# Patient Record
Sex: FEMALE | Race: WHITE | NOT HISPANIC OR LATINO | Employment: FULL TIME | URBAN - METROPOLITAN AREA
[De-identification: names, ages, dates, MRNs, and addresses within clinical notes are randomized per-mention and may not be internally consistent; named-entity substitution may affect disease eponyms.]

---

## 2017-02-20 ENCOUNTER — TELEPHONE (OUTPATIENT)
Dept: FAMILY MEDICINE | Facility: OTHER | Age: 32
End: 2017-02-20

## 2017-02-20 NOTE — TELEPHONE ENCOUNTER
Summary:    Patient is due/failing the following:   PAP    Action needed:   None previously completed     Type of outreach:    none sent to abstraction    Questions for provider review:    None                                                                                                                                    Steph Ogden       Chart routed to Care Team .        Panel Management Review      Patient has the following on her problem list: None      Composite cancer screening  Chart review shows that this patient is due/due soon for the following Pap Smear

## 2017-02-21 ENCOUNTER — TELEPHONE (OUTPATIENT)
Dept: FAMILY MEDICINE | Facility: OTHER | Age: 32
End: 2017-02-21

## 2017-02-21 NOTE — TELEPHONE ENCOUNTER
----- Message from Steph Ogden CMA sent at 2/20/2017  9:11 AM CST -----  Per media dated 12/28/16 patient completed PAP on 01/01/2014.    Please abstract     Steph Ogden

## 2017-09-28 ENCOUNTER — ALLIED HEALTH/NURSE VISIT (OUTPATIENT)
Dept: FAMILY MEDICINE | Facility: OTHER | Age: 32
End: 2017-09-28
Payer: COMMERCIAL

## 2017-09-28 DIAGNOSIS — Z23 NEED FOR PROPHYLACTIC VACCINATION AND INOCULATION AGAINST INFLUENZA: Primary | ICD-10-CM

## 2017-09-28 PROCEDURE — 99207 ZZC NO CHARGE NURSE ONLY: CPT

## 2017-09-28 PROCEDURE — 90471 IMMUNIZATION ADMIN: CPT

## 2017-09-28 PROCEDURE — 90686 IIV4 VACC NO PRSV 0.5 ML IM: CPT

## 2017-09-28 NOTE — MR AVS SNAPSHOT
After Visit Summary   9/28/2017    Jeannette Sykes    MRN: [id number]           Patient Information     Date Of Birth          1985        Visit Information        Provider Department      9/28/2017 1:00 PM NL FLU SHOT [facility]        Today's Diagnoses     Need for prophylactic vaccination and inoculation against influenza    -  1       Follow-ups after your visit        Who to contact     If you have questions or need follow up information about today's clinic visit or your schedule please contact [facility] directly at [phone].  Normal or non-critical lab and imaging results will be communicated to you by [email or web address], letter or phone within 4 business days after the clinic has received the results. If you do not hear from us within 7 days, please contact the clinic through MomentFeedt or phone. If you have a critical or abnormal lab result, we will notify you by phone as soon as possible.  Submit refill requests through [employer]a or call your pharmacy and they will forward the refill request to us. Please allow 3 business days for your refill to be completed.          Additional Information About Your Visit        MyChart Information     Chroma gives you secure access to your electronic health record. If you see a primary care provider, you can also send messages to your care team and make appointments. If you have questions, please call your primary care clinic.  If you do not have a primary care provider, please call [phone] and they will assist you.        Care EveryWhere ID     This is your Care EveryWhere ID. This could be used by other organizations to access your [place] medical records  [id number]        Your Vitals Were     Last Period                   04/30/2016 (Approximate)            Blood Pressure from Last 3 Encounters:   12/22/16 120/70   03/29/15 118/75    Weight from Last 3 Encounters:   12/22/16 149 lb (67.6 kg)              We Performed  the Following     FLU VAC, SPLIT VIRUS IM > 3 YO (QUADRIVALENT) [68360]     Vaccine Administration, Initial [19639]        Primary Care Provider    None Specified       No primary provider on file.        Equal Access to Services     LEBRON MARR : Hadii aad ku hadmamtasom Loo, robertosalas parisieveretteha, irina silva maresridhar, peng maribellin hayaanils norrismartadasha donohue. So [facility] [phone].    ATENCIÓN: Si habla español, tiene a rodriguez disposición servicios gratuitos de asistencia lingüística. Llame al [phone].    We comply with applicable federal civil rights laws and [place] laws. We do not discriminate on the basis of race, color, national origin, age, disability sex, sexual orientation or gender identity.            Thank you!     Thank you for choosing [facility]  for your care. Our goal is always to provide you with excellent care. Hearing back from our patients is one way we can continue to improve our services. Please take a few minutes to complete the written survey that you may receive in the mail after your visit with us. Thank you!             Your Updated Medication List - Protect others around you: Learn how to safely use, store and throw away your medicines at www.disposemymeds.org.          This list is accurate as of: 9/28/17  1:22 PM.  Always use your most recent med list.                   Brand Name Dispense Instructions for use Diagnosis    PRENATAL 1 PO

## 2017-09-28 NOTE — PROGRESS NOTES
Injectable Influenza Immunization Documentation    1.  Is the person to be vaccinated sick today?   No    2. Does the person to be vaccinated have an allergy to a component   of the vaccine?   No    3. Has the person to be vaccinated ever had a serious reaction   to influenza vaccine in the past?   No    4. Has the person to be vaccinated ever had Guillain-Barré syndrome?   No    Form completed by Terrie Youngblood CMA     Prior to injection verified patient identity using patient's name and date of birth. Patient instructed to remain in clinic for 20 minutes afterwards, and to report any adverse reaction to me immediately.

## 2018-03-25 ENCOUNTER — HEALTH MAINTENANCE LETTER (OUTPATIENT)
Age: 33
End: 2018-03-25

## 2018-06-07 ENCOUNTER — OFFICE VISIT (OUTPATIENT)
Dept: FAMILY MEDICINE | Facility: OTHER | Age: 33
End: 2018-06-07
Payer: COMMERCIAL

## 2018-06-07 VITALS
WEIGHT: 146.8 LBS | HEIGHT: 64 IN | SYSTOLIC BLOOD PRESSURE: 116 MMHG | HEART RATE: 74 BPM | TEMPERATURE: 98.3 F | RESPIRATION RATE: 16 BRPM | BODY MASS INDEX: 25.06 KG/M2 | DIASTOLIC BLOOD PRESSURE: 70 MMHG

## 2018-06-07 DIAGNOSIS — H60.92 OTITIS EXTERNA OF LEFT EAR, UNSPECIFIED CHRONICITY, UNSPECIFIED TYPE: ICD-10-CM

## 2018-06-07 DIAGNOSIS — B37.84: Primary | ICD-10-CM

## 2018-06-07 LAB
GRAM STN SPEC: ABNORMAL
GRAM STN SPEC: ABNORMAL
Lab: ABNORMAL
SPECIMEN SOURCE: ABNORMAL

## 2018-06-07 PROCEDURE — 87077 CULTURE AEROBIC IDENTIFY: CPT | Performed by: NURSE PRACTITIONER

## 2018-06-07 PROCEDURE — 99214 OFFICE O/P EST MOD 30 MIN: CPT | Performed by: NURSE PRACTITIONER

## 2018-06-07 PROCEDURE — 87070 CULTURE OTHR SPECIMN AEROBIC: CPT | Performed by: NURSE PRACTITIONER

## 2018-06-07 PROCEDURE — 87205 SMEAR GRAM STAIN: CPT | Performed by: NURSE PRACTITIONER

## 2018-06-07 PROCEDURE — 87186 SC STD MICRODIL/AGAR DIL: CPT | Performed by: NURSE PRACTITIONER

## 2018-06-07 RX ORDER — CLOTRIMAZOLE 1 G/ML
SOLUTION TOPICAL 2 TIMES DAILY
Qty: 30 ML | Refills: 0 | Status: SHIPPED | OUTPATIENT
Start: 2018-06-07 | End: 2018-06-21

## 2018-06-07 RX ORDER — OFLOXACIN 3 MG/ML
1 SOLUTION/ DROPS OPHTHALMIC 2 TIMES DAILY
Qty: 1 BOTTLE | Refills: 0 | Status: SHIPPED | OUTPATIENT
Start: 2018-06-07 | End: 2022-01-31

## 2018-06-07 NOTE — MR AVS SNAPSHOT
After Visit Summary   6/7/2018    Jeannette Sykes    MRN: [id number]           Patient Information     Date Of Birth          1985        Visit Information        Provider Department      6/7/2018 8:40 AM Vanessa Hernandez APRN [facility]        Today's Diagnoses     Otitis externa, candida    -  1    Otitis externa of left ear, unspecified chronicity, unspecified type          Care Instructions      External Ear Infection (Adult)    External otitis (also called  swimmer s ear ) is an infection in the ear canal. It is often caused by bacteria or fungus. It can occur a few days after water gets trapped in the ear canal (from swimming or bathing). It can also occur after cleaning too deeply in the ear canal with a cotton swab or other object. Sometimes, hair care products get into the ear canal and cause this problem.  Symptoms can include pain, fever, itching, redness, drainage, or swelling of the ear canal. Temporary hearing loss may also occur.  Home care    Do not try to clean the ear canal. This can push pus and bacteria deeper into the canal.    Use prescribed ear drops as directed. These help reduce swelling and fight the infection. If an ear wick was placed in the ear canal, apply drops right onto the end of the wick. The wick will draw the medicine into the ear canal even if it is swollen closed.    A cotton ball may be loosely placed in the outer ear to absorb any drainage.    You may use acetaminophen or ibuprofen to control pain, unless another medicine was prescribed. Note: If you have chronic liver or kidney disease or ever had a stomach ulcer or GI bleeding, talk to your healthcare provider before taking any of these medicines.    Do not allow water to get into your ear when bathing. Also, don't swim until the infection has cleared.  Prevention    Keep your ears dry. This helps lower the risk of infection. Dry your ears with a towel or hair dryer after getting  wet. Also, use ear plugs when swimming.    Do not stick any objects in the ear to remove wax.    If you feel water trapped in your ear, use ear drops right away. You can get these drops over the counter at most drugstores. They work by removing water from the ear canal.  Follow-up care  Follow up with your healthcare provider in 1 week, or as advised.  When to seek medical advice  Call your healthcare provider right away if any of these occur:    Ear pain becomes worse or doesn t improve after 3 days of treatment    Redness or swelling of the outer ear occurs or gets worse    Headache    Painful or stiff neck    Drowsiness or confusion    Fever of 100.4 F (38 C) or higher, or as directed by your healthcare provider    Seizure  Date Last Reviewed: 10/1/2017    5752-1518 [employer]. [address] [place] [address]. All rights reserved. This information is not intended as a substitute for professional medical care. Always follow your healthcare professional's instructions.      Thank you  Vanessa Hernandez CNP            Follow-ups after your visit        Who to contact     If you have questions or need follow up information about today's clinic visit or your schedule please contact [facility] directly at [phone].  Normal or non-critical lab and imaging results will be communicated to you by MyChart, letter or phone within 4 business days after the clinic has received the results. If you do not hear from us within 7 days, please contact the clinic through ObserveIThart or phone. If you have a critical or abnormal lab result, we will notify you by phone as soon as possible.  Submit refill requests through [employer] or call your pharmacy and they will forward the refill request to us. Please allow 3 business days for your refill to be completed.          Additional Information About Your Visit        [employer] Information     [employer] gives you secure access to your electronic health  "record. If you see a primary care provider, you can also send messages to your care team and make appointments. If you have questions, please call your primary care clinic.  If you do not have a primary care provider, please call [phone] and they will assist you.        Care EveryWhere ID     This is your Care EveryWhere ID. This could be used by other organizations to access your [place] medical records  [id number]        Your Vitals Were     Pulse Temperature Respirations Height BMI (Body Mass Index)       74 98.3  F (36.8  C) (Oral) 16 5' 4.3\" (1.633 m) 24.96 kg/m2        Blood Pressure from Last 3 Encounters:   06/07/18 116/70   12/22/16 120/70   03/29/15 118/75    Weight from Last 3 Encounters:   06/07/18 146 lb 12.8 oz (66.6 kg)   12/22/16 149 lb (67.6 kg)              We Performed the Following     Ear Culture Aerobic Bacterial     Gram stain          Today's Medication Changes          These changes are accurate as of 6/7/18  9:31 AM.  If you have any questions, ask your nurse or doctor.               Start taking these medicines.        Dose/Directions    clotrimazole 1 % solution   Commonly known as:  LOTRIMIN   Used for:  Otitis externa, candida   Started by:  Vanessa Hernandez APRN CNP        Apply topically 2 times daily for 14 days   Quantity:  30 mL   Refills:  0       ofloxacin 0.3 % ophthalmic solution   Commonly known as:  OCUFLOX   Used for:  Otitis externa of left ear, unspecified chronicity, unspecified type   Started by:  Vanessa Hernandez APRN CNP        Dose:  1 drop   Place 1 drop Into the left ear 2 times daily   Quantity:  1 Bottle   Refills:  0            Where to get your medicines      These medications were sent to [id number] - [place], [place] - [address]  [address] [address]    Hours:  Formerly [place] - numbers unchanged   9/8/03  Phone:  [phone]     clotrimazole 1 % solution    ofloxacin 0.3 % ophthalmic solution                " Primary Care Provider Office Phone # Fax #    Vanessa Hernandez, MARIE [phone]       [address] [place] [address] [address]  [facility] [address]        Equal Access to Services     LEBRON MARR : Hadii aad ku hadmamtao Soomaali, waaxda luqadaha, qaybta kaalmada adeegyada, waxandreea maribellin hayaan chapis reshmadasha donohue. So [facility] [phone].    ATENCIÓN: Si habla español, tiene a rodriguez disposición servicios gratuitos de asistencia lingüística. Llame al [phone].    We comply with applicable federal civil rights laws and [place] laws. We do not discriminate on the basis of race, color, national origin, age, disability, sex, sexual orientation, or gender identity.            Thank you!     Thank you for choosing [facility]  for your care. Our goal is always to provide you with excellent care. Hearing back from our patients is one way we can continue to improve our services. Please take a few minutes to complete the written survey that you may receive in the mail after your visit with us. Thank you!             Your Updated Medication List - Protect others around you: Learn how to safely use, store and throw away your medicines at www.disposemymeds.org.          This list is accurate as of 6/7/18  9:31 AM.  Always use your most recent med list.                   Brand Name Dispense Instructions for use Diagnosis    clotrimazole 1 % solution    LOTRIMIN    30 mL    Apply topically 2 times daily for 14 days    Otitis externa, candida       ofloxacin 0.3 % ophthalmic solution    OCUFLOX    1 Bottle    Place 1 drop Into the left ear 2 times daily    Otitis externa of left ear, unspecified chronicity, unspecified type

## 2018-06-07 NOTE — PROGRESS NOTES
SUBJECTIVE:   Jeannette Sykes is a 33 year old female who presents to clinic today for the following health issues:    HPI  Concern - Left Ear pain   Onset: Monday    Description:   Was diagnosed with swimmers ear last year and has used the drops for this twice. This pain feels different.     Intensity: moderate    Progression of Symptoms:  worsening    Accompanying Signs & Symptoms:  -pain in left ear, throbbing with intermittent shooting pain  -ear inside feels swollen  -hearing does not sound crisp  -pt could not touch ear last night because of intense pain   -feels a pulling sensation when smiling or turning head inside the ear     Previous history of similar problem:     - Recent history of MRSA (diagnosed last month) she was treated with bactrim the wound is now improved.     Therapies Tried and outcome: swimmers ear drops, ice, heat     She was treated last in Dec. 2017 she was treated and symptoms improved. She started to have symptoms Monday. With pain that has worsened it does improve with ibuprofen. States symptoms are intense pain that is throbbing and random shooting pain. The area feels full and pressure. She state itching and burning sensation is present    She denies history of ear infections as a child. She denies wearing ear plugs or swimming.     Problem list and histories reviewed & adjusted, as indicated.  Additional history: as documented    Patient Active Problem List   Diagnosis     Female infertility     History reviewed. No pertinent surgical history.    Social History   Substance Use Topics     Smoking status: Former Smoker     Smokeless tobacco: Never Used     Alcohol use No     Family History   Problem Relation Age of Onset     Alzheimer Disease Mother          Current Outpatient Prescriptions   Medication Sig Dispense Refill     clotrimazole (LOTRIMIN) 1 % solution Apply topically 2 times daily for 14 days 30 mL 0     ofloxacin (OCUFLOX) 0.3 % ophthalmic solution Place 1 drop Into the left  "ear 2 times daily 1 Bottle 0     No Known Allergies  BP Readings from Last 3 Encounters:   06/07/18 116/70   12/22/16 120/70   03/29/15 118/75    Wt Readings from Last 3 Encounters:   06/07/18 146 lb 12.8 oz (66.6 kg)   12/22/16 149 lb (67.6 kg)                  Labs reviewed in EPIC    ROS:  Constitutional, HEENT, cardiovascular, pulmonary, gi and gu systems are negative, except as otherwise noted.    OBJECTIVE:     /70 (BP Location: Left arm, Patient Position: Chair, Cuff Size: Adult Large)  Pulse 74  Temp 98.3  F (36.8  C) (Oral)  Resp 16  Ht 5' 4.3\" (1.633 m)  Wt 146 lb 12.8 oz (66.6 kg)  BMI 24.96 kg/m2  Body mass index is 24.96 kg/(m^2).  GENERAL: healthy, alert and no distress  EYES: Eyes grossly normal to inspection, PERRL and conjunctivae and sclerae normal  HENT: normal cephalic/atraumatic, left ear: erythematous, purulent drainage in canal and red and boggy canal, nose and mouth without ulcers or lesions, oropharynx clear and oral mucous membranes moist  NECK: no adenopathy, no asymmetry, masses, or scars and thyroid normal to palpation  RESP: lungs clear to auscultation - no rales, rhonchi or wheezes  CV: regular rate and rhythm, normal S1 S2, no S3 or S4, no murmur, click or rub, no peripheral edema and peripheral pulses strong  SKIN: no suspicious lesions or rashes    Diagnostic Test Results:  none     ASSESSMENT/PLAN:     1. Otitis externa, candida  Due to symptoms of itching and burning as well as erythema in external ear canal will treat for candida.   - clotrimazole (LOTRIMIN) 1 % solution; Apply topically 2 times daily for 14 days  Dispense: 30 mL; Refill: 0  - Ear Culture Aerobic Bacterial  - Gram stain    2. Otitis externa of left ear, unspecified chronicity, unspecified type  Will also treat for otitis externa due to purulent ear drainage and external ear erythema and pain.   Culture obtained.   - ofloxacin (OCUFLOX) 0.3 % ophthalmic solution; Place 1 drop Into the left ear 2 times " daily  Dispense: 1 Bottle; Refill: 0  - Ear Culture Aerobic Bacterial  - Gram stain    Patient Instructions     External Ear Infection (Adult)    External otitis (also called  swimmer s ear ) is an infection in the ear canal. It is often caused by bacteria or fungus. It can occur a few days after water gets trapped in the ear canal (from swimming or bathing). It can also occur after cleaning too deeply in the ear canal with a cotton swab or other object. Sometimes, hair care products get into the ear canal and cause this problem.  Symptoms can include pain, fever, itching, redness, drainage, or swelling of the ear canal. Temporary hearing loss may also occur.  Home care    Do not try to clean the ear canal. This can push pus and bacteria deeper into the canal.    Use prescribed ear drops as directed. These help reduce swelling and fight the infection. If an ear wick was placed in the ear canal, apply drops right onto the end of the wick. The wick will draw the medicine into the ear canal even if it is swollen closed.    A cotton ball may be loosely placed in the outer ear to absorb any drainage.    You may use acetaminophen or ibuprofen to control pain, unless another medicine was prescribed. Note: If you have chronic liver or kidney disease or ever had a stomach ulcer or GI bleeding, talk to your healthcare provider before taking any of these medicines.    Do not allow water to get into your ear when bathing. Also, don't swim until the infection has cleared.  Prevention    Keep your ears dry. This helps lower the risk of infection. Dry your ears with a towel or hair dryer after getting wet. Also, use ear plugs when swimming.    Do not stick any objects in the ear to remove wax.    If you feel water trapped in your ear, use ear drops right away. You can get these drops over the counter at most drugstores. They work by removing water from the ear canal.  Follow-up care  Follow up with your healthcare provider in 1  week, or as advised.  When to seek medical advice  Call your healthcare provider right away if any of these occur:    Ear pain becomes worse or doesn t improve after 3 days of treatment    Redness or swelling of the outer ear occurs or gets worse    Headache    Painful or stiff neck    Drowsiness or confusion    Fever of 100.4 F (38 C) or higher, or as directed by your healthcare provider    Seizure  Date Last Reviewed: 10/1/2017    9872-7687 [employer]. [address] [place] [address]. All rights reserved. This information is not intended as a substitute for professional medical care. Always follow your healthcare professional's instructions.      Thank you  Vanessa Hernandez [facility]

## 2018-06-07 NOTE — PATIENT INSTRUCTIONS
External Ear Infection (Adult)    External otitis (also called  swimmer s ear ) is an infection in the ear canal. It is often caused by bacteria or fungus. It can occur a few days after water gets trapped in the ear canal (from swimming or bathing). It can also occur after cleaning too deeply in the ear canal with a cotton swab or other object. Sometimes, hair care products get into the ear canal and cause this problem.  Symptoms can include pain, fever, itching, redness, drainage, or swelling of the ear canal. Temporary hearing loss may also occur.  Home care    Do not try to clean the ear canal. This can push pus and bacteria deeper into the canal.    Use prescribed ear drops as directed. These help reduce swelling and fight the infection. If an ear wick was placed in the ear canal, apply drops right onto the end of the wick. The wick will draw the medicine into the ear canal even if it is swollen closed.    A cotton ball may be loosely placed in the outer ear to absorb any drainage.    You may use acetaminophen or ibuprofen to control pain, unless another medicine was prescribed. Note: If you have chronic liver or kidney disease or ever had a stomach ulcer or GI bleeding, talk to your healthcare provider before taking any of these medicines.    Do not allow water to get into your ear when bathing. Also, don't swim until the infection has cleared.  Prevention    Keep your ears dry. This helps lower the risk of infection. Dry your ears with a towel or hair dryer after getting wet. Also, use ear plugs when swimming.    Do not stick any objects in the ear to remove wax.    If you feel water trapped in your ear, use ear drops right away. You can get these drops over the counter at most drugstores. They work by removing water from the ear canal.  Follow-up care  Follow up with your healthcare provider in 1 week, or as advised.  When to seek medical advice  Call your healthcare provider right away if any of these  occur:    Ear pain becomes worse or doesn t improve after 3 days of treatment    Redness or swelling of the outer ear occurs or gets worse    Headache    Painful or stiff neck    Drowsiness or confusion    Fever of 100.4 F (38 C) or higher, or as directed by your healthcare provider    Seizure  Date Last Reviewed: 10/1/2017    3051-3992 [employer]. [address] [place] [address]. All rights reserved. This information is not intended as a substitute for professional medical care. Always follow your healthcare professional's instructions.      Thank you  Vanessa Hernandez CNP

## 2018-06-09 LAB
BACTERIA SPEC CULT: ABNORMAL
Lab: ABNORMAL
SPECIMEN SOURCE: ABNORMAL

## 2018-06-11 ENCOUNTER — TELEPHONE (OUTPATIENT)
Dept: FAMILY MEDICINE | Facility: OTHER | Age: 33
End: 2018-06-11

## 2018-06-11 RX ORDER — GENTAMICIN SULFATE 3 MG/ML
2 SOLUTION/ DROPS OPHTHALMIC 2 TIMES DAILY
Qty: 3 ML | Refills: 0 | Status: SHIPPED | OUTPATIENT
Start: 2018-06-11 | End: 2018-06-26

## 2018-06-11 NOTE — TELEPHONE ENCOUNTER
Spoke with pt and gave information below. Pt would like to know if she should stop both the medications you gave her previously or just the ofloxacin as this is being replaced by the gentamicin.    Adrianne Yeboah CMA ([facility])

## 2018-06-11 NOTE — TELEPHONE ENCOUNTER
----- Message from MARIE Bashir CNP sent at 2018  1:33 PM CDT -----  Please advise Jeannette Sykes,  1985, that her lab results were positive for MRSA infection change made to antibiotic therapy. If symptoms do not clear recommend ENT referral.   [phone] (home)   Thank you  Vanessa Hernandez CNP

## 2018-06-13 NOTE — TELEPHONE ENCOUNTER
Called patient and she states that she was already informed of the message.    Nadja Mccabe, CMA  June 13, 2018

## 2018-10-10 ENCOUNTER — TELEPHONE (OUTPATIENT)
Dept: FAMILY MEDICINE | Facility: OTHER | Age: 33
End: 2018-10-10

## 2018-10-10 DIAGNOSIS — H60.92 RECURRENT OTITIS EXTERNA OF LEFT EAR: Primary | ICD-10-CM

## 2018-10-10 NOTE — TELEPHONE ENCOUNTER
Reason for Call:  Other referral to ENT    Detailed comments: pt states seen for ear flare up ion 06/7/18 with Rose Marie and was told if it kept re occurring that rose marie would write a referral to ENT. Pt wondering if Rose Marie can go forward with writing this referral. Please advise     Phone Number Patient can be reached at: Cell number on file:    Telephone Information:   Mobile [phone]       Best Time: ANY    Can we leave a detailed message on this number? YES    Call taken on 10/10/2018 at 3:18 PM by Suyapa Neely

## 2018-10-10 NOTE — TELEPHONE ENCOUNTER
Patient is going to call her insurance company and let us know if there is a place closer to [facility].     No

## 2018-10-10 NOTE — TELEPHONE ENCOUNTER
Attempted call. No answer and mailbox is full.       ENT referral information below:    Your provider has referred you to: [facility]: [facility] [phone]

## 2018-10-10 NOTE — TELEPHONE ENCOUNTER
Patient informed. She is wondering if there is anything closer to [facility]? [phone]

## 2018-10-11 NOTE — TELEPHONE ENCOUNTER
Reason for Call:  Other returning call    Detailed comments: Patient called clinic back regarding message from below. She wants referral sent to [facility] - phone number: [phone]/fax number: [phone]  Please send referral.     Phone Number Patient can be reached at: Home number on file [phone] (home)    Best Time: any    Can we leave a detailed message on this number? YES    Call taken on 10/11/2018 at 11:22 AM by Idalia Bee

## 2018-10-12 NOTE — TELEPHONE ENCOUNTER
New referral placed to ENT [facility].     Thank you  Vanessa Hernandez CNP

## 2018-10-12 NOTE — TELEPHONE ENCOUNTER
Referral has been faxed, I attempted to contact patient but mailbox was full unable to leave a message try again later  Lubna Ritchie RT (R)

## 2018-10-26 ENCOUNTER — TELEPHONE (OUTPATIENT)
Dept: FAMILY MEDICINE | Facility: OTHER | Age: 33
End: 2018-10-26

## 2018-10-26 NOTE — TELEPHONE ENCOUNTER
You placed a referral for patient to ENT on 10/10/18.  Patient has not scheduled as of yet.      Please review and forward to team if follow up with the patient is needed.     Thank you!  Celine/Clinic Referrals Dyad II

## 2019-10-07 ENCOUNTER — IMMUNIZATION (OUTPATIENT)
Dept: FAMILY MEDICINE | Facility: OTHER | Age: 34
End: 2019-10-07
Payer: COMMERCIAL

## 2019-10-07 DIAGNOSIS — Z23 NEED FOR PROPHYLACTIC VACCINATION AND INOCULATION AGAINST INFLUENZA: Primary | ICD-10-CM

## 2019-10-07 PROCEDURE — 90471 IMMUNIZATION ADMIN: CPT

## 2019-10-07 PROCEDURE — 90686 IIV4 VACC NO PRSV 0.5 ML IM: CPT

## 2019-10-07 PROCEDURE — 99207 ZZC NO CHARGE NURSE ONLY: CPT

## 2020-02-10 ENCOUNTER — MEDICAL CORRESPONDENCE (OUTPATIENT)
Dept: HEALTH INFORMATION MANAGEMENT | Facility: CLINIC | Age: 35
End: 2020-02-10

## 2020-02-24 ENCOUNTER — MEDICAL CORRESPONDENCE (OUTPATIENT)
Dept: HEALTH INFORMATION MANAGEMENT | Facility: CLINIC | Age: 35
End: 2020-02-24

## 2020-03-10 ENCOUNTER — HEALTH MAINTENANCE LETTER (OUTPATIENT)
Age: 35
End: 2020-03-10

## 2020-03-12 ENCOUNTER — TELEPHONE (OUTPATIENT)
Dept: PEDIATRICS | Facility: OTHER | Age: 35
End: 2020-03-12

## 2020-03-12 DIAGNOSIS — Z20.828 EXPOSURE TO INFLUENZA: Primary | ICD-10-CM

## 2020-03-12 RX ORDER — OSELTAMIVIR PHOSPHATE 75 MG/1
75 CAPSULE ORAL DAILY
Qty: 10 CAPSULE | Refills: 0 | Status: SHIPPED | OUTPATIENT
Start: 2020-03-12 | End: 2020-03-22

## 2020-03-12 NOTE — TELEPHONE ENCOUNTER
Son has influenza A, new baby at home.  Will prophylax.  Electronically signed by Terrie Mclaughlin M.D.

## 2020-05-01 ENCOUNTER — MEDICAL CORRESPONDENCE (OUTPATIENT)
Dept: HEALTH INFORMATION MANAGEMENT | Facility: CLINIC | Age: 35
End: 2020-05-01

## 2020-05-29 ENCOUNTER — MEDICAL CORRESPONDENCE (OUTPATIENT)
Dept: HEALTH INFORMATION MANAGEMENT | Facility: CLINIC | Age: 35
End: 2020-05-29

## 2020-08-04 ENCOUNTER — MEDICAL CORRESPONDENCE (OUTPATIENT)
Dept: HEALTH INFORMATION MANAGEMENT | Facility: CLINIC | Age: 35
End: 2020-08-04

## 2020-10-15 ENCOUNTER — IMMUNIZATION (OUTPATIENT)
Dept: FAMILY MEDICINE | Facility: OTHER | Age: 35
End: 2020-10-15
Payer: COMMERCIAL

## 2020-10-15 DIAGNOSIS — Z23 NEED FOR PROPHYLACTIC VACCINATION AND INOCULATION AGAINST INFLUENZA: Primary | ICD-10-CM

## 2020-10-15 PROCEDURE — 99207 PR NO CHARGE NURSE ONLY: CPT

## 2020-10-15 PROCEDURE — 90471 IMMUNIZATION ADMIN: CPT

## 2020-10-15 PROCEDURE — 90686 IIV4 VACC NO PRSV 0.5 ML IM: CPT

## 2020-11-20 ENCOUNTER — VIRTUAL VISIT (OUTPATIENT)
Dept: FAMILY MEDICINE | Facility: OTHER | Age: 35
End: 2020-11-20
Payer: COMMERCIAL

## 2020-11-20 PROCEDURE — 99421 OL DIG E/M SVC 5-10 MIN: CPT | Performed by: PHYSICIAN ASSISTANT

## 2020-11-20 NOTE — PROGRESS NOTES
"Date: 2020 11:18:34  Clinician: Yaniv Akhtar  Clinician NPI: [id number]  Patient: Jeannette Sykes  Patient : 1985  Patient Address: [address] [place] [address]  Patient Phone: [phone]  Visit Protocol: URI  Patient Summary:  Jeannette is a 35 year old ( : 1985 ) female who initiated a [facility] Visit for COVID-19 (Coronavirus) evaluation and screening. When asked the question \"Please sign me up to receive news, health information and promotions from [facility].\", Jeannette responded \"No\".    Jeannette states her symptoms started 1-2 days ago.   Her symptoms consist of rhinitis, a sore throat, ageusia, a headache, and nasal congestion.   Symptom details     Nasal secretions: The color of her mucus is yellow.    Sore throat: Jeannette reports having mild throat pain (1-3 on a 10 point pain scale), does not have exudate on her tonsils, and can swallow liquids. She is not sure if the lymph nodes in her neck are enlarged. A rash has not appeared on the skin since the sore throat started.     Headache: She states the headache is mild (1-3 on a 10 point pain scale).      Jeannette denies having vomiting, facial pain or pressure, myalgias, chills, malaise, teeth pain, diarrhea, ear pain, wheezing, fever, cough, nausea, and anosmia. She also denies taking antibiotic medication in the past month, having recent facial or sinus surgery in the past 60 days, and having a sinus infection within the past year. She is not experiencing dyspnea.   Precipitating events  Jeannette is not sure if she has been exposed to someone with strep throat.   Pertinent COVID-19 (Coronavirus) information  Jeannette does not work or volunteer as healthcare worker or a . In the past 14 days, Jeannette has not worked or volunteered at a healthcare facility or group living setting.   In the past 14 days, she also has not lived in a congregate living setting.   Jeannette has not had a close contact with a laboratory-confirmed COVID-19 patient within " 14 days of symptom onset.    Since December 2019, Jeannette has been tested for COVID-19 and has had upper respiratory infection (URI) or influenza-like illness.      Result of COVID-19 test: Negative     Date of her COVID-19 test: 08/16/2020     Date(s) of previous URI or influenza-like illness (free-text): The month of August     Symptoms Jeannette experienced during previous URI or influenza-like illness as reported by the patient (free-text): Cold, headache, fever, cough, congested, runny nose        Pertinent medical history  Jeannette does not get yeast infections when she takes antibiotics.   Jeannette does not need a return to work/school note.   Weight: 145 lbs   Jeannette does not smoke or use smokeless tobacco.   She denies pregnancy and denies breastfeeding. She has menstruated in the past month.   Weight: 145 lbs    MEDICATIONS: No current medications, ALLERGIES: NKDA  Clinician Response:  Dear Jeannette,   Your symptoms show that you may have coronavirus (COVID-19). This illness can cause fever, cough and trouble breathing. Many people get a mild case and get better on their own. Some people can get very sick.  What should I do?  We would like to test you for this virus.   1. Please call [phone] to schedule your visit. Explain that you were referred by [facility] to have a COVID-19 test. Be ready to share your [facility] visit ID number.  * If you need to schedule in [facility] please call [phone] or for [employer] employees please call [phone].  * If you need to schedule in the [place] area please call [phone]. [place] employees call [phone].  The following will serve as your written order for this COVID Test, ordered by me, for the indication of suspected COVID [Z20.828]: The test will be ordered in [employer]Context, our electronic health record, after you are scheduled. It will show as ordered and authorized by Gary Sanders MD.  Order: COVID-19 (Coronavirus) PCR for SYMPTOMATIC testing from [facility].   2. When it's time  "for your COVID test:  Stay at least 6 feet away from others. (If someone will drive you to your test, stay in the backseat, as far away from the  as you can.)   Cover your mouth and nose with a mask, tissue or washcloth.  Go straight to the testing site. Don't make any stops on the way there or back.      3.Starting now: Stay home and away from others (self-isolate) until:   You've had no fever---and no medicine that reduces fever---for one full day (24 hours). And...   Your other symptoms have gotten better. For example, your cough or breathing has improved. And...   At least 10 days have passed since your symptoms started.       During this time, don't leave the house except for testing or medical care.   Stay in your own room, even for meals. Use your own bathroom if you can.   Stay away from others in your home. No hugging, kissing or shaking hands. No visitors.  Don't go to work, school or anywhere else.    Clean \"high touch\" surfaces often (doorknobs, counters, handles, etc.). Use a household cleaning spray or wipes. You'll find a full list of  on the EPA website: www.epa.gov/pesticide-registration/list-n-disinfectants-use-against-sars-cov-2.   Cover your mouth and nose with a mask, tissue or washcloth to avoid spreading germs.  Wash your hands and face often. Use soap and water.  Caregivers in these groups are at risk for severe illness due to COVID-19:  o People 65 years and older  o People who live in a nursing home or long-term care facility  o People with chronic disease (lung, heart, cancer, diabetes, kidney, liver, immunologic)  o People who have a weakened immune system, including those who:   Are in cancer treatment  Take medicine that weakens the immune system, such as corticosteroids  Had a bone marrow or organ transplant  Have an immune deficiency  Have poorly controlled HIV or AIDS  Are obese (body mass index of 40 or higher)  Smoke regularly   o Caregivers should wear gloves while " washing dishes, handling laundry and cleaning bedrooms and bathrooms.  o Use caution when washing and drying laundry: Don't shake dirty laundry, and use the warmest water setting that you can.  o For more tips, go to www.cdc.gov/coronavirus/2019-ncov/downloads/10Things.pdf.    How can I take care of myself?    Get lots of rest. Drink extra fluids (unless a doctor has told you not to).   Take Tylenol (acetaminophen) for fever or pain. If you have liver or kidney problems, ask your family doctor if it's okay to take Tylenol.   Adults can take either:    650 mg (two 325 mg pills) every 4 to 6 hours, or...   1,000 mg (two 500 mg pills) every 8 hours as needed.    Note: Don't take more than 3,000 mg in one day. Acetaminophen is found in many medicines (both prescribed and over-the-counter medicines). Read all labels to be sure you don't take too much.   For children, check the Tylenol bottle for the right dose. The dose is based on the child's age or weight.    If you have other health problems (like cancer, heart failure, an organ transplant or severe kidney disease): Call your specialty clinic if you don't feel better in the next 2 days.       Know when to call 911. Emergency warning signs include:    Trouble breathing or shortness of breath Pain or pressure in the chest that doesn't go away Feeling confused like you haven't felt before, or not being able to wake up Bluish-colored lips or face.  Where can I get more information?   [facility] -- About COVID-19: [email or web address]   CDC -- What to Do If You're Sick: www.cdc.gov/coronavirus/2019-ncov/about/steps-when-sick.html   CDC -- Ending Home Isolation: www.cdc.gov/coronavirus/2019-ncov/hcp/disposition-in-home-patients.html   CDC -- Caring for Someone: www.cdc.gov/coronavirus/2019-ncov/if-you-are-sick/care-for-someone.html   [facility] -- Interim Guidance for Hospital Discharge to Home: www.[facility]./diseases/coronavirus/hcp/hospdischarge.pdf    [facility] clinical trials (COVID-19 research studies): clinicalaffairs.[facility]-clinical-trials    Below are the COVID-19 hotlines at the [facility] of Health ([facility]). Interpreters are available.    For health questions: Call 998-733-5528 or 1-148.959.6142 (7 a.m. to 7 p.m.) For questions about schools and childcare: Call 196-736-9574 or 1-279.623.6558 (7 a.m. to 7 p.m.)    Diagnosis: Contact with and (suspected) exposure to other viral communicable diseases  Diagnosis ICD: Z20.828

## 2021-04-24 ENCOUNTER — HEALTH MAINTENANCE LETTER (OUTPATIENT)
Age: 36
End: 2021-04-24

## 2021-10-03 ENCOUNTER — HEALTH MAINTENANCE LETTER (OUTPATIENT)
Age: 36
End: 2021-10-03

## 2022-01-31 ENCOUNTER — OFFICE VISIT (OUTPATIENT)
Dept: OBGYN | Facility: OTHER | Age: 37
End: 2022-01-31
Payer: COMMERCIAL

## 2022-01-31 VITALS
TEMPERATURE: 98.6 F | DIASTOLIC BLOOD PRESSURE: 89 MMHG | SYSTOLIC BLOOD PRESSURE: 140 MMHG | HEART RATE: 96 BPM | BODY MASS INDEX: 21 KG/M2 | WEIGHT: 123.5 LBS

## 2022-01-31 DIAGNOSIS — N92.0 MENORRHAGIA WITH REGULAR CYCLE: ICD-10-CM

## 2022-01-31 DIAGNOSIS — Z01.419 WELL WOMAN EXAM WITH ROUTINE GYNECOLOGICAL EXAM: Primary | ICD-10-CM

## 2022-01-31 DIAGNOSIS — Z30.09 GENERAL COUNSELING FOR PRESCRIPTION OF ORAL CONTRACEPTIVES: ICD-10-CM

## 2022-01-31 DIAGNOSIS — Z12.4 SCREENING FOR MALIGNANT NEOPLASM OF CERVIX: ICD-10-CM

## 2022-01-31 LAB
ERYTHROCYTE [DISTWIDTH] IN BLOOD BY AUTOMATED COUNT: 13.2 % (ref 10–15)
HCT VFR BLD AUTO: 40.3 % (ref 35–47)
HGB BLD-MCNC: 13.1 G/DL (ref 11.7–15.7)
MCH RBC QN AUTO: 30.5 PG (ref 26.5–33)
MCHC RBC AUTO-ENTMCNC: 32.5 G/DL (ref 31.5–36.5)
MCV RBC AUTO: 94 FL (ref 78–100)
PLATELET # BLD AUTO: 306 10E3/UL (ref 150–450)
RBC # BLD AUTO: 4.3 10E6/UL (ref 3.8–5.2)
TSH SERPL DL<=0.005 MIU/L-ACNC: 1.92 MU/L (ref 0.4–4)
WBC # BLD AUTO: 8.3 10E3/UL (ref 4–11)

## 2022-01-31 PROCEDURE — 87624 HPV HI-RISK TYP POOLED RSLT: CPT | Performed by: ADVANCED PRACTICE MIDWIFE

## 2022-01-31 PROCEDURE — G0145 SCR C/V CYTO,THINLAYER,RESCR: HCPCS | Performed by: ADVANCED PRACTICE MIDWIFE

## 2022-01-31 PROCEDURE — 36415 COLL VENOUS BLD VENIPUNCTURE: CPT | Performed by: ADVANCED PRACTICE MIDWIFE

## 2022-01-31 PROCEDURE — 84443 ASSAY THYROID STIM HORMONE: CPT | Performed by: ADVANCED PRACTICE MIDWIFE

## 2022-01-31 PROCEDURE — 85027 COMPLETE CBC AUTOMATED: CPT | Performed by: ADVANCED PRACTICE MIDWIFE

## 2022-01-31 PROCEDURE — 99385 PREV VISIT NEW AGE 18-39: CPT | Performed by: ADVANCED PRACTICE MIDWIFE

## 2022-01-31 RX ORDER — NORETHINDRONE ACETATE AND ETHINYL ESTRADIOL 1MG-20(21)
1 KIT ORAL DAILY
Qty: 84 TABLET | Refills: 3 | Status: SHIPPED | OUTPATIENT
Start: 2022-01-31 | End: 2022-05-25

## 2022-01-31 NOTE — LETTER
January 31, 2022      RE: Jeannette Sykes  [address]  [facility] [address]       To whom it may concern:    Jeannette Sykes was seen in our clinic.  She tested positive for COVID on 1/11/2022.  And is currently completely recovered from symptoms of COVID.    Sincerely,      Kathia BENAVIDEZ

## 2022-01-31 NOTE — PROGRESS NOTES
CC/HPI: Jeannette Sykes is a 36 year old female who presents for annual exam. She is currently using None/None needed for birth control.  Concerns today include: menorrhagia.   Pap  Letter for travel secondary to COVID.  Weight loss.       HISTORIES: updated and reviewed    Patient Active Problem List   Diagnosis     Female infertility     Past Medical History:   Diagnosis Date     Female infertility      Menorrhagia with regular cycle      Past Surgical History:   Procedure Laterality Date      SECTION      x 2     ROTATOR CUFF REPAIR RT/LT       SEPTOPLASTY       Current Outpatient Medications   Medication Sig Dispense Refill     norethindrone-ethinyl estradiol (LOESTRIN FE ) 1-20 MG-MCG tablet Take 1 tablet by mouth daily Take one tablet daily 84 tablet 3     No Known Allergies  Social History     Socioeconomic History     Marital status:      Spouse name: Not on file     Number of children: Not on file     Years of education: Not on file     Highest education level: Not on file   Occupational History     Not on file   Tobacco Use     Smoking status: Former Smoker     Smokeless tobacco: Never Used   Substance and Sexual Activity     Alcohol use: No     Drug use: No     Sexual activity: Yes     Partners: Male     Birth control/protection: None   Other Topics Concern     Parent/sibling w/ CABG, MI or angioplasty before 65F 55M? Not Asked   Social History Narrative     Not on file     Social Determinants of Health     Financial Resource Strain: Not on file   Food Insecurity: Not on file   Transportation Needs: Not on file   Physical Activity: Not on file   Stress: Not on file   Social Connections: Not on file   Intimate Partner Violence: Not on file   Housing Stability: Not on file     Family History   Problem Relation Age of Onset     Alzheimer Disease Mother      Hypertension Father      Substance Abuse Maternal Grandmother      Anxiety Disorder Maternal Grandmother      Cerebrovascular Disease  Maternal Grandmother          Menstrual History  every 28-32 days  Length of menses: 5 days  Menstrual description: heavy      ROS:  CONSTITUTIONAL: NEGATIVE for fever, chills  INTEGUMENTARY/SKIN: NEGATIVE for worrisome rashes, moles or lesions  EYES: NEGATIVE for vision changes   ENT/MOUTH: NEGATIVE for ear, mouth and throat problems  RESP: NEGATIVE for significant cough or SOB  BREAST: NEGATIVE for masses, tenderness or discharge  CV: NEGATIVE for chest pain, palpitations   GI: NEGATIVE for nausea, abdominal pain, heartburn, or change in bowel habits  : NEGATIVE for frequency, dysuria, or hematuria  MUSCULOSKELETAL: NEGATIVE for significant arthralgias or myalgia  NEURO: NEGATIVE for weakness, dizziness or paresthesias or headache    EXAM:  BP (!) 140/89 (BP Location: Right arm, Patient Position: Sitting, Cuff Size: Adult Regular)   Pulse 96   Temp 98.6  F (37  C) (Temporal)   Wt 56 kg (123 lb 8 oz)   LMP 01/14/2022 (Exact Date)   BMI 21.00 kg/m    General - pleasant female in no acute distress.  Skin - no suspicious lesions or rashes  EENT-  PERRLA, tympanic membranes intact and pearly gray, euthyroid with out palpable nodules  Neck - supple without lymphadenopathy.  Lungs - clear to auscultation bilaterally.  Heart - regular rate and rhythm without murmur.  Breasts- symmetrical . No dominant fixed or suspicious masses noted.  No skin or nipple changes or axillary nodes.   Abdomen - soft, nontender, nondistended, no masses or organomegaly noted.  Musculoskeletal - no gross deformities.  Neurological - normal strength, sensation, and mental status.  Pelvic - EG: normal  female, vulva reveals no erythema or lesions.   BUS: within normal limits.  Vagina: well rugated, no lesions polyps or suspicious  discharge.     Cervix: no lesions, polyps discharge or CMT.  Rectovaginal - deferred.    ASSESSMENT/PLAN  (Z01.419) Well woman exam with routine gynecological exam  (primary encounter diagnosis)  Comment:   Plan:      (Z30.09) General counseling for prescription of oral contraceptives  Comment:   Plan: norethindrone-ethinyl estradiol (LOESTRIN FE         1/20) 1-20 MG-MCG tablet          The use of the oral contraceptive pill has been fully discussed with the patient. This includes the proper method to initiate  and continue the pill, the need for regular compliance to ensure adequate contraceptive effect, the physiology which makes the pill effective, the instructions for what to do in event of a missed pill, and warnings about anticipated minor side effects such as breakthrough spotting, nausea, breast tenderness, weight changes, acne, headaches, etc. She was informed of the irregular bleeding pattern that can occur when the pill is first started or a new form is changed over for the first 2-3 months.  She has been told of the more serious potential side effects such as MI, stroke, and deep vein thrombosis, all of which are very unlikely.  She has been asked to report any signs of such serious problems immediately.   She understands and wishes to take the medication as prescribed.      (Z12.4) Screening for malignant neoplasm of cervix  Comment:   Plan: Pap screen with HPV - recommended age 30 - 65         years                 The following topics were discussed or recommended  regular exercise  healthy diet/nutrition  contraception    Encouraged to increase her calorie intake.  She can do this by eating 3 rather than 1 meal a day and focus on calorie dense foods.   Encouraged to consider an IUD if the pill doesn't work for her.   Brochure provided    Body mass index is 21 kg/m .  Obesity Action Plan:

## 2022-02-02 LAB
BKR LAB AP GYN ADEQUACY: NORMAL
BKR LAB AP GYN INTERPRETATION: NORMAL
BKR LAB AP HPV REFLEX: NORMAL
BKR LAB AP LMP: NORMAL
BKR LAB AP PREVIOUS ABNORMAL: NORMAL
PATH REPORT.COMMENTS IMP SPEC: NORMAL
PATH REPORT.COMMENTS IMP SPEC: NORMAL
PATH REPORT.RELEVANT HX SPEC: NORMAL

## 2022-02-04 LAB
HUMAN PAPILLOMA VIRUS 16 DNA: NEGATIVE
HUMAN PAPILLOMA VIRUS 18 DNA: NEGATIVE
HUMAN PAPILLOMA VIRUS FINAL DIAGNOSIS: NORMAL
HUMAN PAPILLOMA VIRUS OTHER HR: NEGATIVE

## 2022-02-07 ENCOUNTER — TELEPHONE (OUTPATIENT)
Dept: FAMILY MEDICINE | Facility: CLINIC | Age: 37
End: 2022-02-07
Payer: COMMERCIAL

## 2022-02-07 ENCOUNTER — ALLIED HEALTH/NURSE VISIT (OUTPATIENT)
Dept: FAMILY MEDICINE | Facility: OTHER | Age: 37
End: 2022-02-07
Payer: COMMERCIAL

## 2022-02-07 DIAGNOSIS — Z23 ENCOUNTER FOR IMMUNIZATION: Primary | ICD-10-CM

## 2022-02-07 PROCEDURE — 90471 IMMUNIZATION ADMIN: CPT

## 2022-02-07 PROCEDURE — 99207 PR NO CHARGE NURSE ONLY: CPT

## 2022-02-07 PROCEDURE — 90686 IIV4 VACC NO PRSV 0.5 ML IM: CPT

## 2022-02-07 NOTE — TELEPHONE ENCOUNTER
Pt was seen 1/31/22. She received a signed letter from Dr. Nubia Overton but forgot it in the clinic. Can provider please print and sign and leave at  for pt to ? She will be in the [place] clinic with her children today at 235. Pt needs this by Thursday.     Telma Mcghee, CECILN, RN, PHN  Registered Nurse-Clinic Triage  [facility]  2/7/2022 at 10:16 AM

## 2022-02-10 ENCOUNTER — NURSE TRIAGE (OUTPATIENT)
Dept: NURSING | Facility: CLINIC | Age: 37
End: 2022-02-10
Payer: COMMERCIAL

## 2022-02-10 ENCOUNTER — TELEPHONE (OUTPATIENT)
Dept: OBGYN | Facility: OTHER | Age: 37
End: 2022-02-10
Payer: COMMERCIAL

## 2022-02-10 NOTE — TELEPHONE ENCOUNTER
"Patient calling in with regards to note.    [place] be back 2/20     is a bit worried the note written will not be good enough.    Needs to have this written in note per patient.    \"will be traveling to [place] 2/12/22-2/20/22 she is clear to fly without COVID-19 testing. Per CDC guidelines there is no need to retest her for the next 90 days for COVID-19.\"    Patient states with leaving in two days, she will need to pick this up in clinic today or tomorrow 2/11 latest.     Routing to provider to advise    HERNÁN Mooney/[employer] [facility]  February 10, 2022      "

## 2022-02-10 NOTE — TELEPHONE ENCOUNTER
Pt calling back asking that the letter she needs to  has information from both the letters (one from 1/31/22 and today).   Her  is advising that the more information the better, and also that it is signed by the provider.     Please call pt when the letter is available to be picked up.   [phone]. Ok to leave a detailed message if needed.     Fang José, RN, BSN

## 2022-02-10 NOTE — TELEPHONE ENCOUNTER
needs to be added to note.    Fadia Pathak RN  [facility] Nurse Advisor

## 2022-02-10 NOTE — TELEPHONE ENCOUNTER
Patient calling back stating this letter also needs to be signed. And when ready please call and she will  at the .

## 2022-04-05 ENCOUNTER — TELEPHONE (OUTPATIENT)
Dept: FAMILY MEDICINE | Facility: OTHER | Age: 37
End: 2022-04-05
Payer: COMMERCIAL

## 2022-04-05 DIAGNOSIS — Z32.01 PREGNANCY TEST POSITIVE: Primary | ICD-10-CM

## 2022-04-05 NOTE — TELEPHONE ENCOUNTER
LMP 3/9/2022, 3w6d, positive HPT yesterday, periods have been regular every 28 days, not on any form of BC.    Pt wanting HCG quants to be done. Pt denies vaginal bleeding or cramping at this time.    RN routing to provider for advisement on orders, RN will then assist in scheduling pt.    Sheyla Esposito RN on 4/5/2022 at 2:42 PM

## 2022-04-05 NOTE — TELEPHONE ENCOUNTER
"Patient calling. Had a positive at home pregnancy test. She states she is very early in her pregnancy, she is at the point now where she should be getting her period. She has not technically \"missed\" her period yet.     Patient would like to have HCG levels drawn. She last saw OB provider for well woman exam.     Routing to OB team to call patient and assist in scheduling future appointments and labs.     Elizabeth JACOBN, RN       "

## 2022-04-05 NOTE — TELEPHONE ENCOUNTER
Latoya Florian, DO  You Just now (3:07 PM)     KK    HCG orders signed. Recommend at least two draws, 48-72 hours apart to trend rise. Also recommend OB intake/new OB in first trimester.     Thank you,   Latoya Florian, DO     Message text      RN assisted pt in scheduling appts.    Patient verbalized understanding and agreed to plan.     Sheyla Esposito RN on 4/5/2022 at 3:19 PM

## 2022-04-12 ENCOUNTER — LAB (OUTPATIENT)
Dept: LAB | Facility: OTHER | Age: 37
End: 2022-04-12
Payer: COMMERCIAL

## 2022-04-12 DIAGNOSIS — Z32.01 PREGNANCY TEST POSITIVE: ICD-10-CM

## 2022-04-12 LAB — B-HCG SERPL-ACNC: 8303 IU/L (ref 0–5)

## 2022-04-12 PROCEDURE — 84702 CHORIONIC GONADOTROPIN TEST: CPT

## 2022-04-12 PROCEDURE — 36415 COLL VENOUS BLD VENIPUNCTURE: CPT

## 2022-04-14 ENCOUNTER — LAB (OUTPATIENT)
Dept: LAB | Facility: OTHER | Age: 37
End: 2022-04-14
Payer: COMMERCIAL

## 2022-04-14 DIAGNOSIS — Z32.01 PREGNANCY TEST POSITIVE: ICD-10-CM

## 2022-04-14 LAB — B-HCG SERPL-ACNC: ABNORMAL IU/L (ref 0–5)

## 2022-04-14 PROCEDURE — 84702 CHORIONIC GONADOTROPIN TEST: CPT

## 2022-04-14 PROCEDURE — 36415 COLL VENOUS BLD VENIPUNCTURE: CPT

## 2022-04-15 ENCOUNTER — TELEPHONE (OUTPATIENT)
Dept: OBGYN | Facility: OTHER | Age: 37
End: 2022-04-15
Payer: COMMERCIAL

## 2022-04-15 NOTE — TELEPHONE ENCOUNTER
See pt's mychart response and sent to provider for advisement.    RN closing this encounter.    Sheyla Esposito RN on 4/15/2022 at 9:15 AM

## 2022-04-15 NOTE — TELEPHONE ENCOUNTER
Spoke with patient. Yesterday had some spotting dark brown like end on period when she wipe and she inserted finger to see if there was anymore and pulled a small clot the size of rey dark brown and stringy.  This am was less dark brown. This is only when wipping.     Low back feels like cramping.      Was recently treated for a sinus infection: amoxicillin      Benozinate: tessalon lindsey  No fever.    Two recent HCG levels.    hCG Quantitative   Date Value Ref Range Status   04/14/2022 14,707 (H) 0 - 5 IU/L Final     Comment:     Adult:0-5 IU/L for healthy non-pregnant person  Neonates:Should be within normal ranges by 2 days after birth     Previous 04/12/2022  8,303    Please advise if you would want her seen today or just monitor and if needed to repeat HCG?    Clemente Felix, BSN, RN, PHN  [facility] ~ Registered Nurse  Clinic Triage ~ [facility]  April 15, 2022

## 2022-04-16 ENCOUNTER — LAB (OUTPATIENT)
Dept: LAB | Facility: CLINIC | Age: 37
End: 2022-04-16
Payer: COMMERCIAL

## 2022-04-16 DIAGNOSIS — Z32.01 PREGNANCY TEST POSITIVE: ICD-10-CM

## 2022-04-16 LAB — B-HCG SERPL-ACNC: ABNORMAL IU/L (ref 0–5)

## 2022-04-16 PROCEDURE — 84702 CHORIONIC GONADOTROPIN TEST: CPT

## 2022-04-16 PROCEDURE — 36415 COLL VENOUS BLD VENIPUNCTURE: CPT

## 2022-04-18 ENCOUNTER — ANCILLARY PROCEDURE (OUTPATIENT)
Dept: ULTRASOUND IMAGING | Facility: OTHER | Age: 37
End: 2022-04-18
Attending: OBSTETRICS & GYNECOLOGY
Payer: COMMERCIAL

## 2022-04-18 DIAGNOSIS — O20.9 VAGINAL BLEEDING AFFECTING EARLY PREGNANCY: ICD-10-CM

## 2022-04-18 PROCEDURE — 76817 TRANSVAGINAL US OBSTETRIC: CPT | Performed by: RADIOLOGY

## 2022-04-18 PROCEDURE — 76801 OB US < 14 WKS SINGLE FETUS: CPT | Performed by: RADIOLOGY

## 2022-04-19 ENCOUNTER — PRENATAL OFFICE VISIT (OUTPATIENT)
Dept: OBGYN | Facility: CLINIC | Age: 37
End: 2022-04-19
Payer: COMMERCIAL

## 2022-04-19 DIAGNOSIS — O09.529 AMA (ADVANCED MATERNAL AGE) MULTIGRAVIDA 35+: Primary | ICD-10-CM

## 2022-04-19 DIAGNOSIS — Z23 NEED FOR TDAP VACCINATION: ICD-10-CM

## 2022-04-19 PROBLEM — N93.9 ABNORMAL UTERINE BLEEDING (AUB): Status: ACTIVE | Noted: 2019-03-05

## 2022-04-19 PROBLEM — A49.02 MRSA (METHICILLIN RESISTANT STAPHYLOCOCCUS AUREUS) INFECTION: Status: ACTIVE | Noted: 2018-11-09

## 2022-04-19 PROBLEM — Z87.440 HISTORY OF UTI: Status: ACTIVE | Noted: 2019-07-24

## 2022-04-19 PROBLEM — Z87.42 HISTORY OF ABNORMAL CERVICAL PAP SMEAR: Status: ACTIVE | Noted: 2018-12-04

## 2022-04-19 PROCEDURE — 99207 PR NO CHARGE NURSE ONLY: CPT

## 2022-04-19 RX ORDER — BENZONATATE 100 MG/1
CAPSULE ORAL
COMMUNITY
Start: 2022-03-31 | End: 2022-05-25

## 2022-04-19 RX ORDER — FERROUS SULFATE 325(65) MG
325 TABLET, DELAYED RELEASE (ENTERIC COATED) ORAL
COMMUNITY
End: 2022-05-26

## 2022-04-19 RX ORDER — VITAMIN A ACETATE, .BETA.-CAROTENE, ASCORBIC ACID, CHOLECALCIFEROL, .ALPHA.-TOCOPHEROL ACETATE, DL-, THIAMINE MONONITRATE, RIBOFLAVIN, NIACINAMIDE, PYRIDOXINE HYDROCHLORIDE, FOLIC ACID, CYANOCOBALAMIN, CALCIUM CARBONATE, FERROUS FUMARATE, ZINC OXIDE, AND CUPRIC OXIDE 2000; 2000; 120; 400; 22; 1.84; 3; 20; 10; 1; 12; 200; 27; 25; 2 [IU]/1; [IU]/1; MG/1; [IU]/1; MG/1; MG/1; MG/1; MG/1; MG/1; MG/1; UG/1; MG/1; MG/1; MG/1; MG/1
1 TABLET ORAL DAILY
COMMUNITY

## 2022-04-19 RX ORDER — LIDOCAINE HYDROCHLORIDE 20 MG/ML
SOLUTION OROPHARYNGEAL
COMMUNITY
Start: 2022-03-27 | End: 2022-05-25

## 2022-04-19 NOTE — PROGRESS NOTES
Telephone visit with patient for New Prenatal Intake and Education. This is patient's third pregnancy. Handouts reviewed and will be provided at next prenatal appointment. Scheduled for New Prenatal with Kathia SALAZAR CNM on 5/2/2022.       Prenatal OB Questionnaire  Patient supplied answers from flow sheet for:  Prenatal OB Questionnaire.  Past Medical History  Have you ever recieved care for your mental health? : (P) No  Have you ever been in a major accident or suffered serious trauma?: (P) No  Within the last year, has anyone hit, slapped, kicked or otherwise hurt you?: (P) No  In the last year, has anyone forced you to have sex when you didn't want to?: (P) No    Past Medical History 2   Have you ever received a blood transfusion?: (P) No  Would you accept a blood transfusion if was medically recommended?: (P) Unknown  Does anyone in your home smoke?: (P) No   Is your blood type Rh negative?: (P) No  Have you ever ?: (!) (P) Yes  Have you been hospitalized for a nonsurgical reason excluding normal delivery?: (P) No  Have you ever had an abnormal pap smear?: (!) (P) Yes    Past Medical History (Continued)  Do you have a history of abnormalities of the uterus?: (P) No  Did your mother take BEAU or any other hormones when she was pregnant with you?: (P) Unknown  Do you have any other problems we have not asked about which you feel may be important to this pregnancy?: (P) No    PHQ-2 Score:     PHQ-2 ( 1999 Pfizer) 4/17/2022 6/7/2018   Q1: Little interest or pleasure in doing things 0 0   Q2: Feeling down, depressed or hopeless 0 0   PHQ-2 Score 0 0   Q1: Little interest or pleasure in doing things Not at all -   Q2: Feeling down, depressed or hopeless Not at all -   PHQ-2 Score 0 -       Allergies as of 4/19/2022:    Allergies as of 04/19/2022     (No Known Allergies)       Current medications are:  Current Outpatient Medications   Medication Sig Dispense Refill     Prenatal Vit-Fe Fumarate-FA (PNV  PRENATAL PLUS MULTIVITAMIN) 27-1 MG TABS per tablet Take 1 tablet by mouth daily       amoxicillin-clavulanate (AUGMENTIN) 875-125 MG tablet Take 1 tablet by mouth (Patient not taking: Reported on 4/19/2022)       amoxicillin-clavulanate (AUGMENTIN) 875-125 MG tablet TAKE 1 TABLET BY MOUTH TWICE A DAY FOR 7 DAYS (Patient not taking: Reported on 4/19/2022)       benzonatate (TESSALON) 100 MG capsule SWALLOW WHOLE TAKE 1 CAPSULE 3 TIMES A DAY AS NEEDED *DO NOT BREAK CHEW, DISSOLVE, CUT, OR CRUSH* (Patient not taking: Reported on 4/19/2022)       ferrous sulfate (FE TABS) 325 (65 Fe) MG EC tablet Take 325 mg by mouth (Patient not taking: Reported on 4/19/2022)       lidocaine, viscous, (XYLOCAINE) 2 % solution TAKE 10-15ML BY MOUTH EVERY 4 HOURS AS NEEDED FOR SORE THROAT FOR UP TO 3 DAYS. GARGLE AND SPIT SOLUTION. MAX OF 8 DOSES/DAY (Patient not taking: Reported on 4/19/2022)       norethindrone-ethinyl estradiol (LOESTRIN FE 1/20) 1-20 MG-MCG tablet Take 1 tablet by mouth daily Take one tablet daily (Patient not taking: Reported on 4/19/2022) 84 tablet 3         Early ultrasound screening tool:    Does patient have irregular periods?  Yes  Did patient use hormonal birth control in the three months prior to positive urine pregnancy test? No  Is the patient breastfeeding?  No  Is the patient 10 weeks or greater at time of education visit?  No    Pt had early US done yesterday, no repeat order placed.    Sheyla Esposito RN on 4/19/2022 at 1:17 PM

## 2022-05-02 ENCOUNTER — PRENATAL OFFICE VISIT (OUTPATIENT)
Dept: OBGYN | Facility: OTHER | Age: 37
End: 2022-05-02
Payer: COMMERCIAL

## 2022-05-02 VITALS
HEART RATE: 76 BPM | DIASTOLIC BLOOD PRESSURE: 87 MMHG | WEIGHT: 127.75 LBS | BODY MASS INDEX: 21.72 KG/M2 | SYSTOLIC BLOOD PRESSURE: 134 MMHG

## 2022-05-02 DIAGNOSIS — O09.521 MULTIGRAVIDA OF ADVANCED MATERNAL AGE IN FIRST TRIMESTER: Primary | ICD-10-CM

## 2022-05-02 DIAGNOSIS — Z87.59 HISTORY OF PRE-ECLAMPSIA: ICD-10-CM

## 2022-05-02 DIAGNOSIS — O36.0910 ANTI-E ISOIMMUNIZATION AFFECTING PREGNANCY IN FIRST TRIMESTER, SINGLE OR UNSPECIFIED FETUS: ICD-10-CM

## 2022-05-02 DIAGNOSIS — Z98.891 HISTORY OF 2 CESAREAN SECTIONS: ICD-10-CM

## 2022-05-02 LAB
ABO/RH(D): ABNORMAL
ALBUMIN UR-MCNC: NEGATIVE MG/DL
ANTIBODY SCREEN: POSITIVE
APPEARANCE UR: CLEAR
BACTERIA #/AREA URNS HPF: ABNORMAL /HPF
BILIRUB UR QL STRIP: NEGATIVE
COLOR UR AUTO: YELLOW
ERYTHROCYTE [DISTWIDTH] IN BLOOD BY AUTOMATED COUNT: 13.1 % (ref 10–15)
GLUCOSE UR STRIP-MCNC: NEGATIVE MG/DL
HCT VFR BLD AUTO: 37.7 % (ref 35–47)
HGB BLD-MCNC: 12.7 G/DL (ref 11.7–15.7)
HGB UR QL STRIP: ABNORMAL
KETONES UR STRIP-MCNC: NEGATIVE MG/DL
LEUKOCYTE ESTERASE UR QL STRIP: NEGATIVE
MCH RBC QN AUTO: 30.9 PG (ref 26.5–33)
MCHC RBC AUTO-ENTMCNC: 33.7 G/DL (ref 31.5–36.5)
MCV RBC AUTO: 92 FL (ref 78–100)
NITRATE UR QL: NEGATIVE
PH UR STRIP: 5.5 [PH] (ref 5–7)
PLATELET # BLD AUTO: 275 10E3/UL (ref 150–450)
RBC # BLD AUTO: 4.11 10E6/UL (ref 3.8–5.2)
RBC #/AREA URNS AUTO: ABNORMAL /HPF
SP GR UR STRIP: 1.02 (ref 1–1.03)
SPECIMEN EXPIRATION DATE: ABNORMAL
SQUAMOUS #/AREA URNS AUTO: ABNORMAL /LPF
UROBILINOGEN UR STRIP-ACNC: 0.2 E.U./DL
WBC # BLD AUTO: 9 10E3/UL (ref 4–11)
WBC #/AREA URNS AUTO: ABNORMAL /HPF

## 2022-05-02 PROCEDURE — 87086 URINE CULTURE/COLONY COUNT: CPT | Performed by: ADVANCED PRACTICE MIDWIFE

## 2022-05-02 PROCEDURE — 86803 HEPATITIS C AB TEST: CPT | Performed by: ADVANCED PRACTICE MIDWIFE

## 2022-05-02 PROCEDURE — 99207 PR FIRST OB VISIT: CPT | Performed by: ADVANCED PRACTICE MIDWIFE

## 2022-05-02 PROCEDURE — 86870 RBC ANTIBODY IDENTIFICATION: CPT | Performed by: ADVANCED PRACTICE MIDWIFE

## 2022-05-02 PROCEDURE — 86780 TREPONEMA PALLIDUM: CPT | Performed by: ADVANCED PRACTICE MIDWIFE

## 2022-05-02 PROCEDURE — 86762 RUBELLA ANTIBODY: CPT | Performed by: ADVANCED PRACTICE MIDWIFE

## 2022-05-02 PROCEDURE — 81001 URINALYSIS AUTO W/SCOPE: CPT | Performed by: ADVANCED PRACTICE MIDWIFE

## 2022-05-02 PROCEDURE — 86850 RBC ANTIBODY SCREEN: CPT | Performed by: ADVANCED PRACTICE MIDWIFE

## 2022-05-02 PROCEDURE — 85027 COMPLETE CBC AUTOMATED: CPT | Performed by: ADVANCED PRACTICE MIDWIFE

## 2022-05-02 PROCEDURE — 86900 BLOOD TYPING SEROLOGIC ABO: CPT | Performed by: ADVANCED PRACTICE MIDWIFE

## 2022-05-02 PROCEDURE — 87389 HIV-1 AG W/HIV-1&-2 AB AG IA: CPT | Performed by: ADVANCED PRACTICE MIDWIFE

## 2022-05-02 PROCEDURE — 87340 HEPATITIS B SURFACE AG IA: CPT | Performed by: ADVANCED PRACTICE MIDWIFE

## 2022-05-02 PROCEDURE — 86901 BLOOD TYPING SEROLOGIC RH(D): CPT | Performed by: ADVANCED PRACTICE MIDWIFE

## 2022-05-02 PROCEDURE — 36415 COLL VENOUS BLD VENIPUNCTURE: CPT | Performed by: ADVANCED PRACTICE MIDWIFE

## 2022-05-02 NOTE — PATIENT INSTRUCTIONS
Thank for choosing our clinic for your health care needs. Our goal is to provided you with excellent care. One way that we continue to improve our care is by listening to our patients. Please take a few minutes to complete the written survey that you may receive in the mail after your visit.     You may reach your care team by calling the following number:    Keenan Omer- [phone]   For clinic hours at [place] [facility]  please visit the [employer] web site [email or web address]    Notification of your lab results:  Normal or non-critical lab and imaging results will be communicated to you by Mychart, letter, or phone within 7 days. If you do not hear from us within 10 days, please contact us through [employer] or phone. If you have a critical or abnormal lab result, we will notify you by phone as soon as possible.  Pap smears often take a bit longer.       Medication Refills:  Please contact your pharmacy and they will forward the refill to us. Please allow 3 business days for your refills to be completed.     Secure access to your medical record:  Use [employer]at (secure email communication and access to your chart) to send your primary care provider a message or make an appointment. Ask someone on your Team how to sign up for [employer]. To log on to HubSpot or for more information in [employer] please visit the website at [email or web address].            How to prevent CMV or cytomegalovirus infection while you are pregnant:    Thoroughly wash your hands with soap and warm water especially after doing things such as:  Diaper changes  Feeding or bathing a child  Wiping a child's runny nose or drool  Handling a child's toys    Do not share cups, plates, utensils, toothbrushes or food with your children  Do not kiss young children on the mouth or cheek. Instead, kiss them on the head or give them a hug.  Do not share towels or washcloths with young children.  Clean toy, counter tops, and other surfaces that come in  contact with urine or saliva.        LISTERIA  Individuals can reduce the risk for listeria contamination through proper food selection, handling, and storage, such as:  Rinsing raw produce (fuits and vegetables) before eating, cutting, or cooking;  Keeping refrigerators at 40 degrees F or lower;  Buying soft cheeses only if their labels state that they are made with pasteurized milk.  Also avoiding cheese that has not been initially wrapped in plastic.  These cheeses include brie, camembert, blue and the soft Mexican cheeses like con queso.  Heating all food that can be heated but especially hot dogs, luncheon meats, and cold cuts to an internal temperature of 165 degrees F or until steaming hot before serving them; and  Washing your hands for at least 20 seconds with warm water and soap before and after handling cantaloupes or other melons.  Watch for food recalls for listeria and contact us if you believe you have been exposed.               First line remedies for nausea in pregnancy    Eat small frequent meals every 2-3 hours if possible.   Avoid food at extremes of temparture and drinks with carbination.  Eat foods that appeal to you, avoiding fats and spicy foods.  Bread, pasta, crackers, potatoes, and rice tend to be tolerated the best.  Don't worry about what you eat in the first 3 months, it is more important that you can eat and keep it down.   Try flat ginger ale.  Ginger is a herbal remedy for nausea and you can use it in any form.  There are ginger tablets you can purchase.  The dose is 500 to 1000 mg a day.   You may also try doxylamine 12.5 mg three times a day which is a sleeping medication along with Vitamin B6 25 mg three times a day.  This combination takes up to a week to work so give it some time.  Be sure to take both the doxylamine and B6  Doxylamine is sometimes called Unisom and it comes in 25 mg tablets so you will have to break it in half and take half a tablet.   It is important to take  the Unisom and B6 together or it won't be effective.  If you begin to vomit more than 5 or 6 times a day and feel that you are unable to keep anything down, call the clinic for an appointment to be seen.                Fruits and vegetables high in pesticide contamination  Strawberries  Spinach  Kale  Nectarines  Peaches  Apples  Grapes  Cherries  Pears  Tomatoes  Celery  Potatoes  Hot Peppers.     Consider extra washing of these fruits and vegetables, peeling if possible or purchasing organics.     Fruits and vegetables lowest if pesticide contramination:  Avocado  Sweet corn  Pineapple  Cabbage   Onions   Frozen peas  Papaya  Asparagus  Mushrooms  Eggplant  Honeydew  Kiwi  Cantaloupe  Cauliflower  Broccoli      Consider eliminating or reducing the following additives in personal care products and make up:  Triclosan  Paraben  Phthalates  Phenols  Products that do not contain these additives are often found in the organic or green sections of stores.

## 2022-05-02 NOTE — PROGRESS NOTES
Jeannette Sykes is a 36 year old  who presents to the clinic for an new ob visit.   Estimated Date of Delivery: Dec 14, 2022 is calculated from Patient's last menstrual period was 2022.       She has had bleeding since her LMP.  And ultrasound was completed.  Had one day of spotting since..   She has not had nausea. Weigh loss has not occurred.   Pregnancy was a surprise      HISTORY  updated and reviewed  Past Medical History:   Diagnosis Date     Female infertility      Menorrhagia with regular cycle      Past Surgical History:   Procedure Laterality Date      SECTION      x 2     ROTATOR CUFF REPAIR RT/LT       SEPTOPLASTY       Social History     Socioeconomic History     Marital status:      Spouse name: Not on file     Number of children: Not on file     Years of education: Not on file     Highest education level: Not on file   Occupational History     Not on file   Tobacco Use     Smoking status: Former Smoker     Smokeless tobacco: Never Used   Vaping Use     Vaping Use: Never used   Substance and Sexual Activity     Alcohol use: No     Drug use: No     Sexual activity: Yes     Partners: Male     Birth control/protection: None   Other Topics Concern     Parent/sibling w/ CABG, MI or angioplasty before 65F 55M? Not Asked   Social History Narrative     Not on file     Social Determinants of Health     Financial Resource Strain: Not on file   Food Insecurity: Not on file   Transportation Needs: Not on file   Physical Activity: Not on file   Stress: Not on file   Social Connections: Not on file   Intimate Partner Violence: Not on file   Housing Stability: Not on file     Health Maintenance   Topic Date Due     ADVANCE CARE PLANNING  Never done     HIV SCREENING  Never done     HEPATITIS C SCREENING  Never done     DTAP/TDAP/TD IMMUNIZATION (1 - Tdap) 2019     COVID-19 Vaccine (3 - Booster for Moderna series) 2022     PREVENTIVE CARE VISIT  2023     HPV TEST  2027      PAP  2027     PHQ-2 (once per calendar year)  Completed     INFLUENZA VACCINE  Completed     Pneumococcal Vaccine: Pediatrics (0 to 5 Years) and At-Risk Patients (6 to 64 Years)  Aged Out     IPV IMMUNIZATION  Aged Out     MENINGITIS IMMUNIZATION  Aged Out     HEPATITIS B IMMUNIZATION  Aged Out     Family History   Problem Relation Age of Onset     Alzheimer Disease Mother      Hypertension Father      Substance Abuse Maternal Grandmother      Anxiety Disorder Maternal Grandmother      Cerebrovascular Disease Maternal Grandmother             ROS: 10 point ROS neg other than the symptoms noted above in the HPI.      PHYSICAL EXAM  LMP 2022   GENERAL:  Pleasant pregnant female, alert, cooperative  and well groomed.  SKIN:  Warm and dry, without lesions or rashes  HEAD: Symmetrical features.  EYES:  PERRLA.  EARS: Tympanic membranes gray, translucent and intact.  NECK:  Thyroid without enlargement and nodules.  Lymph nodes not palpable.   LUNGS:  Clear to auscultation.  BREAST:  Symmetrical.  No dominant, fixed or suspicious masses are noted.  No skin or nipple changes or axillary nodes.    Nipples everted.      HEART:  RRR with  out murmur.  ABDOMEN: Soft without masses , tenderness or organomegaly.  No CVA tenderness.  Uterus palpable at size equal to dates.  Well healed scar from  section.  MUSCULOSKELETAL:  Full range of motion    ASSESSMENT:  Intrauterine pregnancy of 7w5d  (O09.529) AMA (advanced maternal age) multigravida 35+  (primary encounter diagnosis)  Comment:   Plan: Urine Microscopic Exam            (Z87.59) History of pre-eclampsia  Comment:   Plan:     (Z98.891) History of 2  sections  Comment:   Plan:   Wants a tubal          PLAN:  Options for  testing for chromosomal and birth defects were discussed with the patient. Diagnostic tests include CVS and Amniocentesis. We discussed that these tests are definitive but invasive and do carry a risk of fetal loss.    Screening tests include nuchal translucency/blood marker testing in the first trimester and quad screening in the second trimester. We discussed that these are screening tests and not diagnostic tests and that false positives and negatives are a distinct possibility.  Declines  testing.    Instructed on best evidence for: weight gain for her weight and height for pregnancy; healthy diet and foods to avoid; exercise and activity during pregnancy;avoiding exposure to toxoplasmosis; and maintenance of a generally healthy lifestyle.     [place] [facility] [place]    Reviewed transmission of and avoidance strategies for CMV.        Discussed risks of COVID in pregnancy and  recommendations for vaccine and booster.    Discussed aspirin use in pregnancy.  Low-dose aspirin prophylaxis can be beneficial in women at high risk of developing preeclampsia.  I generally recommend we begin aspirin at about 12-13 weeks gestation and continue until at least 36 weeks.     Women with at least one of the following conditions are considered high risk for developing preeclampsia: Previous pregnancy with preeclampsia,  multifetal-gestation, chronic hypertension, diabetes mellitus, chronic kidney disease, autoimmune disease.     Women with more than 1 of the following conditions may also consider low-dose aspirin prophylaxis in pregnancy: Nulliparity, BMI greater than 30, family history of preeclampsia (mother or sister), AMA, socio-demographic characteristics, personal risk factors.       Patient does  meet the above criteria.  Discussed risks and benefits of low dose Asprin therapy and she elects to proceed.

## 2022-05-03 ENCOUNTER — MYC MEDICAL ADVICE (OUTPATIENT)
Dept: OBGYN | Facility: OTHER | Age: 37
End: 2022-05-03
Payer: COMMERCIAL

## 2022-05-03 LAB
HBV SURFACE AG SERPL QL IA: NONREACTIVE
HCV AB SERPL QL IA: NONREACTIVE
HIV 1+2 AB+HIV1 P24 AG SERPL QL IA: NONREACTIVE
RUBV IGG SERPL QL IA: 4.57 INDEX
RUBV IGG SERPL QL IA: POSITIVE
T PALLIDUM AB SER QL: NONREACTIVE

## 2022-05-04 LAB — BACTERIA UR CULT: NORMAL

## 2022-05-05 ENCOUNTER — MEDICAL CORRESPONDENCE (OUTPATIENT)
Dept: HEALTH INFORMATION MANAGEMENT | Facility: CLINIC | Age: 37
End: 2022-05-05
Payer: COMMERCIAL

## 2022-05-05 ENCOUNTER — TELEPHONE (OUTPATIENT)
Dept: OBGYN | Facility: OTHER | Age: 37
End: 2022-05-05
Payer: COMMERCIAL

## 2022-05-05 NOTE — TELEPHONE ENCOUNTER
Called and discussed results and follow up plan with Mode Machado and MFM referral.   Kathia Overton, MARIE, CNM

## 2022-05-06 LAB
ANTIBODY ID: NORMAL
SPECIMEN EXPIRATION DATE: NORMAL

## 2022-05-09 PROBLEM — R76.8 RED BLOOD CELL ANTIBODY POSITIVE: Status: ACTIVE | Noted: 2022-05-09

## 2022-05-10 ENCOUNTER — LAB (OUTPATIENT)
Dept: LAB | Facility: CLINIC | Age: 37
End: 2022-05-10
Payer: COMMERCIAL

## 2022-05-10 DIAGNOSIS — O36.0910 ANTI-E ISOIMMUNIZATION AFFECTING PREGNANCY IN FIRST TRIMESTER, SINGLE OR UNSPECIFIED FETUS: ICD-10-CM

## 2022-05-10 LAB
SPECIMEN EXPIRATION DATE: NORMAL
XXX BLOOD GROUP AB TITR SERPL: NORMAL {TITER}

## 2022-05-10 PROCEDURE — 86870 RBC ANTIBODY IDENTIFICATION: CPT

## 2022-05-10 PROCEDURE — 36415 COLL VENOUS BLD VENIPUNCTURE: CPT

## 2022-05-10 PROCEDURE — 86850 RBC ANTIBODY SCREEN: CPT

## 2022-05-10 PROCEDURE — 86886 COOMBS TEST INDIRECT TITER: CPT

## 2022-05-11 LAB
ANTIBODY ID: NORMAL
ANTIBODY SCREEN: POSITIVE
SPECIMEN EXPIRATION DATE: ABNORMAL
SPECIMEN EXPIRATION DATE: NORMAL

## 2022-05-25 ENCOUNTER — TRANSFERRED RECORDS (OUTPATIENT)
Dept: HEALTH INFORMATION MANAGEMENT | Facility: CLINIC | Age: 37
End: 2022-05-25
Payer: COMMERCIAL

## 2022-05-25 NOTE — PATIENT INSTRUCTIONS
If you have any questions regarding your visit, Please contact your care team.     [employer]: [phone]  To Schedule an Appointment 24/7  Call: [phone][facility] HOURS TELEPHONE NUMBER     Jacquelyn Joya MD  Assistant Medical Director    Megan- Certified Medical Assistant    Sparkle Levine-HERNÁN Burgess-Surgery Scheduler  Mirella-Surgery Scheduler     Monday-[place]  8:00 am-4:45 pm      Thursday-[place]  8:00 am-4:45 pm      Friday-[place]  7:30 am-4:00 pm    Typical Surgery Day: Tuesday [facility]  [address]  [place], [place] [id number] [phone] Appointment Line  [phone] Fax    Imaging Scheduling all locations  [phone]     [facility] Labor and Delivery  [address] [place] [address] [place] [address]  [phone]    [address] [address] [phone] Appointment Line       **Surgeries** Our Surgery Schedulers will contact you to schedule. If you do not receive a call within 3 business days, please call [phone].    Urgent Care locations:  [facility] Monday-Friday  10 am - 8 pm    Saturday and Sunday   9 am - 5 pm     [phone]   If you need a medication refill, please contact your pharmacy. Please allow 3 business days for your refill to be completed.  As always, Thank you for trusting us with your healthcare needs!      see additional instructions from your care team below

## 2022-05-26 ENCOUNTER — PRENATAL OFFICE VISIT (OUTPATIENT)
Dept: OBGYN | Facility: OTHER | Age: 37
End: 2022-05-26
Payer: COMMERCIAL

## 2022-05-26 VITALS
SYSTOLIC BLOOD PRESSURE: 109 MMHG | BODY MASS INDEX: 21.85 KG/M2 | DIASTOLIC BLOOD PRESSURE: 73 MMHG | WEIGHT: 128.5 LBS | HEART RATE: 82 BPM | OXYGEN SATURATION: 100 %

## 2022-05-26 DIAGNOSIS — R76.8 RED BLOOD CELL ANTIBODY POSITIVE: ICD-10-CM

## 2022-05-26 DIAGNOSIS — Z98.891 HISTORY OF 2 CESAREAN SECTIONS: Primary | ICD-10-CM

## 2022-05-26 DIAGNOSIS — Z87.59 HISTORY OF PRE-ECLAMPSIA: ICD-10-CM

## 2022-05-26 PROCEDURE — 99207 PR PRENATAL VISIT: CPT | Performed by: OBSTETRICS & GYNECOLOGY

## 2022-05-26 NOTE — PROGRESS NOTES
Presents for routine  appointment.  Patient had her 1st  at 36w 4d.  Presented with decreased fetal movement and had a  for non reassuring fetal status remote from delivery.  History of oligohydramnios and SGA in prior pregnancy.    Lower back cramping.   No abnormal discharge, no leaking fluid, no contractions, no vaginal bleeding   ROS:   and GI  negative.     Please see Prenatal Vitals and Notes Flowsheet for objective data.    A/P:  36 year old  at 11w5d       ICD-10-CM    1. History of 2  sections  Z98.891    2. History of pre-eclampsia  Z87.59        Genetic screening -cfDNA done - that is still pending.   History of preE - low dose aspirin for preeclampsia risk reduction   History of  x2, plan repeat with tubal  Anti-E - titer less than 1 on 5/10/22.  Next titer next visit. Her 's blood result is not back yet.   AMA - MFM note reviewed, see below.  Normal NT.  Follow up in July.  They are using the LMP for the due date, which is was not sure of.  She will clarify with them when she sees them next.   Follow up in 4  weeks.      Jacquelyn Joya MD       Impression   =========     1) Romero intrauterine pregnancy at 11w 0d gestational age.   2) Measurements consistent with established dates.   3) The nuchal translucency measurement is within the normal range.   4) The nasal bone was visualized.   5) Visualized anatomy appears normal for early gestational age.     Recommendation   ==============     **REVISED REPORT**     Thank you for referring your patient for a first trimester ultrasound with nuchal translucency screening.     We discussed the results of the ultrasound with the patient.     Your patient had cell free DNA screening today. The results of this screen will be forwarded to you as soon as they are available. Because cell free DNA screening does not   screen for open neural tube defects, the patient should be offered MSAFP screening at  15-20 weeks gestation.     At today?s visit we discussed the basic pathophysiology of red cell alloimmunization and risk of hemolytic disease of the . She was counseled that anti-E   antibodies can cause hemolytic disease of the fetus and . Determining her ?s blood type antigens would further define fetal risk for hemolytic disease.   Because eJannette has had all her children with one partner, and has not had a blood transfusion, it is likely he is at least heterozygous for E. If he is homozygous for E, the   fetus is definitely at potential risk. If he is heterozygous, an amniocentesis could be done to determine fetal genotype and determine if serial titers and ultrasounds are   necessary. Jeannette indicates she is not interested in an invasive procedure. We reviewed that currently her antibody levels are low (<1), and that we would not initiate   additional surveillance at this time. Serial antibody titers are recommended in your office. If her titer reaches 1:8, she would need to undergo serial ultrasounds for weekly   assessment by ultrasound for signs of hydrops and MCA Doppler. We discussed the possibility of fetal blood sampling (PUBS) and transfusion if the MCAs reached a   threshold value of > 1.5 MoM, signifying significant anemia.     Recommendations:   1. Paternal Blood type antigen testing. If positive for E repeat maternal antibody titers every 4 weeks   2. If titers reach >/= 1:8, serial ultrasounds for MCA Doppler will be needed to assess for anemia and possible PUBs and transfusion if > 1.5 MoM.   3. Deliver at term if the maternal antibody never reaches 1:8 or MCA Dopplers remain <1.5 MoM.     Comprehensive ultrasound is scheduled in our office at 18 weeks.     Return to primary provider for continued prenatal care.     If you have questions regarding today's evaluation or if we can be of further service, please contact the [facility].     **Fetal anomalies may be  present but not detected**     REPORT SIGNED BY: Helen Haro MD

## 2022-06-01 ENCOUNTER — MEDICAL CORRESPONDENCE (OUTPATIENT)
Dept: HEALTH INFORMATION MANAGEMENT | Facility: CLINIC | Age: 37
End: 2022-06-01
Payer: COMMERCIAL

## 2022-06-03 ENCOUNTER — TELEPHONE (OUTPATIENT)
Dept: OBGYN | Facility: OTHER | Age: 37
End: 2022-06-03
Payer: COMMERCIAL

## 2022-06-23 ENCOUNTER — PRENATAL OFFICE VISIT (OUTPATIENT)
Dept: OBGYN | Facility: OTHER | Age: 37
End: 2022-06-23
Payer: COMMERCIAL

## 2022-06-23 VITALS
BODY MASS INDEX: 22.32 KG/M2 | OXYGEN SATURATION: 100 % | HEART RATE: 78 BPM | DIASTOLIC BLOOD PRESSURE: 76 MMHG | SYSTOLIC BLOOD PRESSURE: 121 MMHG | WEIGHT: 131.25 LBS

## 2022-06-23 DIAGNOSIS — Z87.59 HISTORY OF PRE-ECLAMPSIA: ICD-10-CM

## 2022-06-23 DIAGNOSIS — O36.0910 ANTI-E ISOIMMUNIZATION AFFECTING PREGNANCY IN FIRST TRIMESTER, SINGLE OR UNSPECIFIED FETUS: Primary | ICD-10-CM

## 2022-06-23 DIAGNOSIS — O09.521 MULTIGRAVIDA OF ADVANCED MATERNAL AGE IN FIRST TRIMESTER: ICD-10-CM

## 2022-06-23 DIAGNOSIS — Z86.14 HISTORY OF MRSA INFECTION: ICD-10-CM

## 2022-06-23 DIAGNOSIS — Z98.891 HISTORY OF 2 CESAREAN SECTIONS: ICD-10-CM

## 2022-06-23 LAB
ANTIBODY ID: NORMAL
ANTIBODY SCREEN: POSITIVE
SPECIMEN EXPIRATION DATE: ABNORMAL
SPECIMEN EXPIRATION DATE: NORMAL
SPECIMEN EXPIRATION DATE: NORMAL
XXX BLOOD GROUP AB TITR SERPL: NORMAL {TITER}

## 2022-06-23 PROCEDURE — 86870 RBC ANTIBODY IDENTIFICATION: CPT | Performed by: OBSTETRICS & GYNECOLOGY

## 2022-06-23 PROCEDURE — 36415 COLL VENOUS BLD VENIPUNCTURE: CPT | Performed by: OBSTETRICS & GYNECOLOGY

## 2022-06-23 PROCEDURE — 86850 RBC ANTIBODY SCREEN: CPT | Performed by: OBSTETRICS & GYNECOLOGY

## 2022-06-23 PROCEDURE — 86886 COOMBS TEST INDIRECT TITER: CPT | Performed by: OBSTETRICS & GYNECOLOGY

## 2022-06-23 PROCEDURE — 99207 PR PRENATAL VISIT: CPT | Performed by: OBSTETRICS & GYNECOLOGY

## 2022-06-23 NOTE — PROGRESS NOTES
Presents for routine  appointment.     No complaints.    No abnormal discharge , no leaking fluid , no contractions , no vaginal bleeding    ROS:   and GI  negative.     Please see Prenatal Vitals and Notes Flowsheet for objective data.    A/P:  37 year old  at 15w5d       ICD-10-CM    1. History of MRSA infection  Z86.14 CANCELED: Methicillin Resistant Staph Aureus PCR   2. 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  Super mild symptoms.  She already has an MFM US and will be getting growth US due to history of SGA in prior pregnancy    1st preg was c/b oligo and SGA.  Uncomplicated 2nd pregnancy.     History of MRSA - cleared at [facility] - will forward to our infection prevention team to get this cleared. She states she was cleared for her last delivery at [facility].     Anti-E - titer less than 1 on 5/10/22.  Next titer today.   is Ee so there is a chance that baby will be E.      AMA - Normal NT.  Follow up in July as noted above.  They are using the LMP for the due date, which is was not sure of.  She will clarify with them when she sees them next.     History of preE - low dose aspirin for preeclampsia risk reduction     History of  x2, plan repeat with tubal.  Sign consent next visit.  Follow up in 4  week(s).      Jacquelyn Joya MD

## 2022-07-27 ENCOUNTER — PRENATAL OFFICE VISIT (OUTPATIENT)
Dept: OBGYN | Facility: CLINIC | Age: 37
End: 2022-07-27
Payer: COMMERCIAL

## 2022-07-27 ENCOUNTER — MYC MEDICAL ADVICE (OUTPATIENT)
Dept: OBGYN | Facility: OTHER | Age: 37
End: 2022-07-27

## 2022-07-27 VITALS
HEART RATE: 103 BPM | BODY MASS INDEX: 23.81 KG/M2 | SYSTOLIC BLOOD PRESSURE: 154 MMHG | DIASTOLIC BLOOD PRESSURE: 109 MMHG | OXYGEN SATURATION: 100 % | WEIGHT: 140 LBS

## 2022-07-27 DIAGNOSIS — O09.521 MULTIGRAVIDA OF ADVANCED MATERNAL AGE IN FIRST TRIMESTER: Primary | ICD-10-CM

## 2022-07-27 PROCEDURE — 99207 PR PRENATAL VISIT: CPT | Performed by: ADVANCED PRACTICE MIDWIFE

## 2022-07-27 NOTE — PROGRESS NOTES
Here today for an extra visit.   Worried secondary to upper abdominal pain over night and progressive shortness of breath.   SOB began on Monday and she feels that every day it is getting worse.  Became winded just walking into the clinic and has not had this in previous pregnancies.  PO2 100% upon arrival today.   Last night when going to bed noted significant upper abdominal pain.  Sounds spasmotic in nature.  She felt perhaps it was BH.  Denies fever, chills, diarrhea and n/v. No change in vaginal discharge.  Baby is active.  Abd felt sore this morning like she had done multiple sit ups.   COVID about 6 weeks ago.  Has had HA on and off with this pregnancy, feels they are worse than with her previous pregnancies.  No relief from tylenol.  Recommend that she go to the ED for evaluation.   Kathia Overton, MARIE, CNM

## 2022-07-27 NOTE — TELEPHONE ENCOUNTER
, 20w4d.    Spoke with pt and she states for the last 3 or 4 days, at night she hasn't been able to get comfortable.  Flipping all positions, and using pillows and gets these shooting pains in stomach from belly button outwards in different directions.  She states when this occurs her belly also gets firm.    She also feels out of breath when she is talking.  And experiences pain  on left side below rib cage when taking a deep breath.  Denies cold like symptoms (cough, congestion).  No fever.  Denies constipation.     Pt states she drinks plenty of water and doesn't feel like she is more stressed or been more active this week.    Pt does have a prenatal appt tomorrow however, pt would like to be seen today just to make sure everything is okay.  Scheduled for a prenatal appt today.    Julianna Langford RN

## 2022-07-28 ENCOUNTER — MYC MEDICAL ADVICE (OUTPATIENT)
Dept: OBGYN | Facility: OTHER | Age: 37
End: 2022-07-28

## 2022-07-28 NOTE — TELEPHONE ENCOUNTER
It looks like they reviewed the results at the time of the visit.  I reviewed the notes and results and agree, labs are within normal limits for pregnancy

## 2022-07-28 NOTE — CONFIDENTIAL NOTE
Pt currently 20w5d, last seen yesterday in clinic and in L&D for elevated BP concerns.    Pt asking for review/interpretation of lab results done at L&D yesterday. RN routing to provider to advise.    Sheyla Esposito RN on 7/28/2022 at 10:55 AM

## 2022-08-01 ENCOUNTER — TELEPHONE (OUTPATIENT)
Dept: OBGYN | Facility: OTHER | Age: 37
End: 2022-08-01

## 2022-08-01 DIAGNOSIS — R06.02 SHORTNESS OF BREATH DUE TO PREGNANCY IN SECOND TRIMESTER: ICD-10-CM

## 2022-08-01 DIAGNOSIS — O26.892 SHORTNESS OF BREATH DUE TO PREGNANCY IN SECOND TRIMESTER: ICD-10-CM

## 2022-08-01 DIAGNOSIS — O13.2 GESTATIONAL HYPERTENSION, SECOND TRIMESTER: ICD-10-CM

## 2022-08-01 DIAGNOSIS — O36.0910 ANTI-E ISOIMMUNIZATION AFFECTING PREGNANCY IN FIRST TRIMESTER, SINGLE OR UNSPECIFIED FETUS: Primary | ICD-10-CM

## 2022-08-01 NOTE — TELEPHONE ENCOUNTER
"Received a call from nurse at [facility]. She state that   Dr. Rouse, would pt to have biweekly BP checks as well as weekly labs (CBC, CMP and the \"main HTN labs\").  When asked what labs are included in the main HTN labs she stated \"Dr. Joya should know\".    [facility] and Dr. Rouse were also wondering why pt didn't have her monthly titer lab drawn in July as she is supposed to have it monthly.  She did have it drawn in May and June but not July.  I do not see that there are standing orders for it.    Routing message to Dr. Joya to place the above quested labs.  Once this has been done we can assist pt in scheduling biweekly nurse visits for a BP check as well as weekly lab appointments.    Julianna Langford RN    "

## 2022-08-01 NOTE — TELEPHONE ENCOUNTER
Discussed with Dr. Rouse.  Patient was seen in triage last week and noted to have a mildly elevated blood pressure.  Patient was seen a couple days later at [facility] and again noted to be hypertensive.  Patient was generally not feeling well at the time.   L&D was closed so patient was seen in the ED.    She was mildly hypoK.  Anion gap was 20.      Called patient.  She is still intermittently feeling unwell.  Still SOB.  She has a BP cuff at home, but not sure if it is measuring correctly.      RN - Please call patient to arrange labs and Nurse visit for BP check for wednesday or thursday.  She will bring her BP cuff to compare.    Also ordered ECHO.  May need to consider chest xray and additional evaluation. See Dr. Rouse's note in Care Everywhere.    Reportable signs and symptoms discussed.

## 2022-08-03 ENCOUNTER — TELEPHONE (OUTPATIENT)
Dept: URGENT CARE | Facility: URGENT CARE | Age: 37
End: 2022-08-03

## 2022-08-03 NOTE — TELEPHONE ENCOUNTER
Pt is calling about referral that was placed 8/1 and she is looking to get scheduled but no one has called her yet. Referring doctor said she should get in this week.     Please call pt to get scheduled at [phone]

## 2022-08-03 NOTE — TELEPHONE ENCOUNTER
Message came to cardiology pool. Called patient and transferred her to Imaging scheduling to set up echo and Labs.     Mirtha Mancia, RN  Cardiology Care Coordinator  [facility] [place]  Phone: [phone]

## 2022-08-09 ENCOUNTER — LAB (OUTPATIENT)
Dept: LAB | Facility: CLINIC | Age: 37
End: 2022-08-09

## 2022-08-09 ENCOUNTER — ANCILLARY PROCEDURE (OUTPATIENT)
Dept: CARDIOLOGY | Facility: CLINIC | Age: 37
End: 2022-08-09
Attending: OBSTETRICS & GYNECOLOGY
Payer: COMMERCIAL

## 2022-08-09 DIAGNOSIS — O36.0910 ANTI-E ISOIMMUNIZATION AFFECTING PREGNANCY IN FIRST TRIMESTER, SINGLE OR UNSPECIFIED FETUS: ICD-10-CM

## 2022-08-09 DIAGNOSIS — O26.892 SHORTNESS OF BREATH DUE TO PREGNANCY IN SECOND TRIMESTER: ICD-10-CM

## 2022-08-09 DIAGNOSIS — O13.2 GESTATIONAL HYPERTENSION, SECOND TRIMESTER: ICD-10-CM

## 2022-08-09 DIAGNOSIS — R06.02 SHORTNESS OF BREATH DUE TO PREGNANCY IN SECOND TRIMESTER: ICD-10-CM

## 2022-08-09 LAB
ALBUMIN MFR UR ELPH: 13 MG/DL
ALBUMIN SERPL-MCNC: 3 G/DL (ref 3.4–5)
ALP SERPL-CCNC: 62 U/L (ref 40–150)
ALT SERPL W P-5'-P-CCNC: 16 U/L (ref 0–50)
ANION GAP SERPL CALCULATED.3IONS-SCNC: 5 MMOL/L (ref 3–14)
ANTIBODY ID: NORMAL
ANTIBODY SCREEN: POSITIVE
AST SERPL W P-5'-P-CCNC: 12 U/L (ref 0–45)
BILIRUB SERPL-MCNC: 0.2 MG/DL (ref 0.2–1.3)
BUN SERPL-MCNC: 7 MG/DL (ref 7–30)
CALCIUM SERPL-MCNC: 8.7 MG/DL (ref 8.5–10.1)
CHLORIDE BLD-SCNC: 109 MMOL/L (ref 94–109)
CO2 SERPL-SCNC: 26 MMOL/L (ref 20–32)
CREAT SERPL-MCNC: 0.57 MG/DL (ref 0.52–1.04)
CREAT UR-MCNC: 157 MG/DL
ERYTHROCYTE [DISTWIDTH] IN BLOOD BY AUTOMATED COUNT: 13.4 % (ref 10–15)
GFR SERPL CREATININE-BSD FRML MDRD: >90 ML/MIN/1.73M2
GLUCOSE BLD-MCNC: 54 MG/DL (ref 70–99)
HCT VFR BLD AUTO: 35.7 % (ref 35–47)
HGB BLD-MCNC: 12 G/DL (ref 11.7–15.7)
LVEF ECHO: NORMAL
MCH RBC QN AUTO: 31.7 PG (ref 26.5–33)
MCHC RBC AUTO-ENTMCNC: 33.6 G/DL (ref 31.5–36.5)
MCV RBC AUTO: 94 FL (ref 78–100)
PLATELET # BLD AUTO: 247 10E3/UL (ref 150–450)
POTASSIUM BLD-SCNC: 4 MMOL/L (ref 3.4–5.3)
PROT SERPL-MCNC: 6.9 G/DL (ref 6.8–8.8)
PROT/CREAT 24H UR: 0.08 MG/MG CR (ref 0–0.2)
RBC # BLD AUTO: 3.79 10E6/UL (ref 3.8–5.2)
SODIUM SERPL-SCNC: 140 MMOL/L (ref 133–144)
SPECIMEN EXPIRATION DATE: ABNORMAL
SPECIMEN EXPIRATION DATE: NORMAL
SPECIMEN EXPIRATION DATE: NORMAL
WBC # BLD AUTO: 8.7 10E3/UL (ref 4–11)
XXX BLOOD GROUP AB TITR SERPL: 1 {TITER}

## 2022-08-09 PROCEDURE — 93306 TTE W/DOPPLER COMPLETE: CPT | Performed by: STUDENT IN AN ORGANIZED HEALTH CARE EDUCATION/TRAINING PROGRAM

## 2022-08-09 PROCEDURE — 85027 COMPLETE CBC AUTOMATED: CPT

## 2022-08-09 PROCEDURE — 86850 RBC ANTIBODY SCREEN: CPT

## 2022-08-09 PROCEDURE — 80053 COMPREHEN METABOLIC PANEL: CPT

## 2022-08-09 PROCEDURE — 86870 RBC ANTIBODY IDENTIFICATION: CPT

## 2022-08-09 PROCEDURE — 84156 ASSAY OF PROTEIN URINE: CPT

## 2022-08-09 PROCEDURE — 36415 COLL VENOUS BLD VENIPUNCTURE: CPT

## 2022-08-09 PROCEDURE — 86886 COOMBS TEST INDIRECT TITER: CPT

## 2022-08-10 LAB
BLOOD BANK CHART COMMENT: NORMAL
SPECIMEN EXPIRATION DATE: NORMAL

## 2022-08-25 ENCOUNTER — PRENATAL OFFICE VISIT (OUTPATIENT)
Dept: OBGYN | Facility: OTHER | Age: 37
End: 2022-08-25
Payer: COMMERCIAL

## 2022-08-25 VITALS
SYSTOLIC BLOOD PRESSURE: 128 MMHG | BODY MASS INDEX: 25.12 KG/M2 | WEIGHT: 147.7 LBS | HEART RATE: 82 BPM | DIASTOLIC BLOOD PRESSURE: 87 MMHG

## 2022-08-25 DIAGNOSIS — O09.521 MULTIGRAVIDA OF ADVANCED MATERNAL AGE IN FIRST TRIMESTER: ICD-10-CM

## 2022-08-25 DIAGNOSIS — O36.0910 ANTI-E ISOIMMUNIZATION AFFECTING PREGNANCY IN FIRST TRIMESTER, SINGLE OR UNSPECIFIED FETUS: Primary | ICD-10-CM

## 2022-08-25 LAB
GLUCOSE 1H P 50 G GLC PO SERPL-MCNC: 75 MG/DL (ref 70–129)
HGB BLD-MCNC: 11.3 G/DL (ref 11.7–15.7)
SPECIMEN EXPIRATION DATE: NORMAL
XXX BLOOD GROUP AB TITR SERPL: <1 {TITER}

## 2022-08-25 PROCEDURE — 36415 COLL VENOUS BLD VENIPUNCTURE: CPT | Performed by: OBSTETRICS & GYNECOLOGY

## 2022-08-25 PROCEDURE — 86850 RBC ANTIBODY SCREEN: CPT | Performed by: OBSTETRICS & GYNECOLOGY

## 2022-08-25 PROCEDURE — 86886 COOMBS TEST INDIRECT TITER: CPT | Performed by: OBSTETRICS & GYNECOLOGY

## 2022-08-25 PROCEDURE — 82950 GLUCOSE TEST: CPT | Performed by: OBSTETRICS & GYNECOLOGY

## 2022-08-25 PROCEDURE — 86905 BLOOD TYPING RBC ANTIGENS: CPT | Performed by: OBSTETRICS & GYNECOLOGY

## 2022-08-25 PROCEDURE — 99207 PR PRENATAL VISIT: CPT | Performed by: OBSTETRICS & GYNECOLOGY

## 2022-08-25 RX ORDER — ASPIRIN 81 MG/1
81 TABLET, CHEWABLE ORAL
COMMUNITY

## 2022-08-25 NOTE — PATIENT INSTRUCTIONS
SIGNS OF  LABOR    Labor is  if it happens more than three weeks before your due date.    It can be hard to know if you are in labor, since the symptoms can be like the normal feelings of pregnancy.  Often, the only difference is the symptoms increase or they don't go away.     Signs of  labor can include:    Change in your vaginal discharge:  You will have more vaginal discharge when you are pregnant and it should be creamy white.  Call the clinic right away if your discharge has a foul odor, pink or bloody,  or if it becomes watery or is more than is normal for you during your pregnancy.    More than 5-6 contractions or tightenings per hour.  Contractions can feel like period cramps or bowel (gas or diarrhea) pain.  You will feel it in the lower part of your abdomen, in your back or as a pressure feeling in your bottom.  It is often regular, coming for 30 seconds or a minute and then going away, only to come back 5 or 10 minutes later. Some contractions are normal during pregnancy, but if you are feeling more than 5-6 in one hour, empty your bladder, then drink 16-24 ounces of water, eat a snack and lay down on your left side. Put your hand on your abdomen to count the contractions.  If after one hour of resting you have still had 5-6 contractions call your clinic.                If you have labs or imaging done, the results will automatically release in [employer] without an interpretation.  Your health care professional will review those results and send an interpretation with recommendations as soon as possible, but this may be 1-3 business days.    If you have any questions regarding your visit, please contact your care team.     [employer]: [phone]  Women s Health CLINIC HOURS TELEPHONE NUMBER       Jacquelyn Joya MD  Assistant Medical Director    Megan - Certified Medical Assistant     Sparkle Burgess-  [place]-     Monday-  [place]  8:00 a.m - 5:00 p.m    Tuesday- Surgery        Thursday- [place]  8:00 a.m - 5:00 p.m.    Friday- [place]  7:30 a.m - 4:00 p.m. [facility]  [address]  [place] [address]  [phone] Fax  Imaging Scheduling [phone]    [facility] Labor and Delivery  [address] [place] [address]  [phone]    [facility]  [address] [place] [address]  [phone] Fax  Imaging Scheduling [phone]     Urgent Care locations:  [facility] Monday-Friday  10 am - 8 pm  Saturday and Sunday   9 am - 5 pm  Monday-Friday   10 am - 8 pm  Saturday and Sunday   9 am - 5 pm   [phone]     **Surgeries** Our Surgery Schedulers will contact you to schedule. If you do not receive a call within 3 business days, please call [phone].    If you need a medication refill, please contact your pharmacy. Please allow 3 business days for your refill to be completed.    As always, thank you for trusting us with your healthcare needs!

## 2022-08-25 NOTE — PROGRESS NOTES
Presents for routine  appointment.     No complaints.  Blood pressures have been normal at home. She still gets some headaches - two weeks on and one week off.  She would like to hold off on Neuro consult for now.  No abnormal discharge , no leaking fluid , no contractions , no vaginal bleeding    ROS:   and GI  negative.     A/P:  37 year old  at 24w5d       ICD-10-CM    1. 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  She does have access to [employer].  She is Rh Positive  TDAP  next visit.    Discussed signs and symptoms of  labor  History of preE - low dose aspirin for preeclampsia risk reduction   History of  x2, plan repeat with tubal.  Sign consent next visit. Estimated Date of Delivery: Dec 10, 2022 - she will be 39 weeks Saturday Dec 3.    Anti-E - continue monthly titers  1st preg was c/b oligo and SGA.  Uncomplicated 2nd pregnancy. - Continue to follow with MFM. She will let us know if she has difficulty scheduling and we will order the growth to be done here at 32 wks.  History covid in 1st trimester.  Normal fetal growth and normal maternal ECHO.  Follow up in 4  weeks.      Jacquelyn Joya MD

## 2022-08-26 ENCOUNTER — TELEPHONE (OUTPATIENT)
Dept: OBGYN | Facility: OTHER | Age: 37
End: 2022-08-26

## 2022-08-26 DIAGNOSIS — O26.892 PREGNANCY HEADACHE IN SECOND TRIMESTER: ICD-10-CM

## 2022-08-26 DIAGNOSIS — R51.9 PREGNANCY HEADACHE IN SECOND TRIMESTER: ICD-10-CM

## 2022-08-26 DIAGNOSIS — O13.2 GESTATIONAL HYPERTENSION, SECOND TRIMESTER: Primary | ICD-10-CM

## 2022-08-26 NOTE — TELEPHONE ENCOUNTER
24w6d    Pt called in wanting referral for neurology due to headaches in pregnancy. Pt also needs US orders for growth US starting at 32 weeks.    Pt questioning if  prenatal appointment is needed - she has a growth US at [facility] that day and wants to know if they will cover the same things as her prenatal appointment.    Routing to provider.    Lidia Vidales RN on 2022 at 3:34 PM

## 2022-08-29 LAB
ANTIBODY SCREEN: POSITIVE
ANTIBODY, PREVIOUSLY IDENTIFIED: NORMAL
E AG RBC QL: NEGATIVE
SPECIMEN EXPIRATION DATE: ABNORMAL
SPECIMEN EXPIRATION DATE: NORMAL
SPECIMEN EXPIRATION DATE: NORMAL

## 2022-08-29 NOTE — TELEPHONE ENCOUNTER
RECORDS RECEIVED FROM: Internal   REASON FOR VISIT: Pregnancy headache in second trimester [O26.892, R51.9]   Date of Appt: 08/30/2022   NOTES (FOR ALL VISITS) STATUS DETAILS   OFFICE NOTE from referring provider Internal 08/26/2022 Dr Joya [facility]    OFFICE NOTE from other specialist N/A    DISCHARGE SUMMARY from hospital N/A    DISCHARGE REPORT from the ER N/A    OPERATIVE REPORT N/A    MEDICATION LIST N/A    IMAGING  (FOR ALL VISITS)     EMG N/A    EEG N/A    LUMBAR PUNCTURE N/A    GUSTAVO SCAN N/A    ULTRASOUND (CAROTID BILAT) *VASCULAR* N/A    MRI (HEAD, NECK, SPINE) N/A    CT (HEAD, NECK, SPINE) N/A

## 2022-08-29 NOTE — TELEPHONE ENCOUNTER
RN called pt to relay providers advisement. Pt currently 25w2d.    Patient verbalized understanding and agreed to plan.     BPP starting at 32 weeks once weekly with us per MFM, pt will continue growths every 4 weeks at M.    RN routing to provider to advise on BPP orders so pt can correlate them with her prenatals as much as possible.    Sheyla Esposito RN on 8/29/2022 at 9:52 AM

## 2022-08-29 NOTE — TELEPHONE ENCOUNTER
I was planning to place the orders at her next visit, but can do that now if she prefers.     Yes, she needs both the prenatal apt because they do not cover everything at the [facility] apt.     Referral for neurology placed

## 2022-08-29 NOTE — TELEPHONE ENCOUNTER
Sent [employer] message notifying pt BPP orders have been placed.    Lidia Vidales RN on 8/29/2022 at 11:46 AM

## 2022-08-30 ENCOUNTER — OFFICE VISIT (OUTPATIENT)
Dept: NEUROLOGY | Facility: CLINIC | Age: 37
End: 2022-08-30
Attending: OBSTETRICS & GYNECOLOGY
Payer: COMMERCIAL

## 2022-08-30 ENCOUNTER — PRE VISIT (OUTPATIENT)
Dept: NEUROLOGY | Facility: CLINIC | Age: 37
End: 2022-08-30

## 2022-08-30 VITALS
WEIGHT: 147 LBS | SYSTOLIC BLOOD PRESSURE: 114 MMHG | DIASTOLIC BLOOD PRESSURE: 71 MMHG | BODY MASS INDEX: 25 KG/M2 | HEART RATE: 79 BPM

## 2022-08-30 DIAGNOSIS — O26.892 PREGNANCY HEADACHE IN SECOND TRIMESTER: ICD-10-CM

## 2022-08-30 DIAGNOSIS — R51.9 PREGNANCY HEADACHE IN SECOND TRIMESTER: ICD-10-CM

## 2022-08-30 PROCEDURE — 99204 OFFICE O/P NEW MOD 45 MIN: CPT | Performed by: INTERNAL MEDICINE

## 2022-08-30 NOTE — TELEPHONE ENCOUNTER
Pt has read [employer] message regarding BPP orders. RN closing encounter.    Sheyla Esposito RN on 8/30/2022 at 7:49 AM

## 2022-08-30 NOTE — LETTER
"    2022         RE: Jeannette Sykes  [address] [place] [address]        Dear Colleague,    Thank you for referring your patient, Jeannette Sykes, to the [facility] [place]. Please see a copy of my visit note below.    [facility] Neurology Consultation    Jeannette Sykes MRN# [id number]   Age: 37 year old YOB: 1985     Requesting physician: Vanessa Serrano     Reason for Consultation: headaches in pregnancy      History of Presenting Symptoms:   Jeannette Sykes is a 37 year old female who presents today for evaluation of headaches in pregnancy.    Patient is currently 25 weeks pregnant. Around 8 weeks she started getting headaches. It felt like she had just had \"a kegger\". They felt like hangover headaches. The headaches seem to slowly develop in the evening. They are worse at night and then linger around during the day. She is getting headaches every few weeks and they last about a week. She treats it with ice packs. She tried tylenol once or twice but didn't get relief. Her last headache was 2 weeks. There doesn't seem to be a clear trigger for the headaches. She denies any light/sound sensitivity. She has had some nausea with pregnancy, but not related to the headaches. She denies any significant neck / upper back pain/tightness. Headaches are more frontal.    With the first pregnancy she had  at 36 weeks. Otherwise she had no complications.     With the second pregnancy she had no issues until the last 2 months when she developed high blood pressure. She wasn't given a diagnosis of pre-eclampsia. She had no issues with headaches in that pregnancy.     About a month ago she had significant issues with dizziness, described as lightheadedness with standing and needing to sit down. This is now better.    During this pregnancy she has had intermittent issues with high blood pressure. She was first noted to have high blood pressure readings at 12 " weeks. She has a blood pressure cuff at home.     She had migraines in high school, but after that didn't get significant headaches.        Past Medical History:     Patient Active Problem List   Diagnosis     Female infertility     Abnormal uterine bleeding (AUB)     History of abnormal cervical Pap smear     History of UTI     MRSA (methicillin resistant Staphylococcus aureus) infection     Need for Tdap vaccination     History of 2  sections     Multigravida of advanced maternal age in first trimester     History of pre-eclampsia     Red blood cell antibody positive     Past Medical History:   Diagnosis Date     Female infertility      Menorrhagia with regular cycle         Past Surgical History:     Past Surgical History:   Procedure Laterality Date      SECTION      x 2     ROTATOR CUFF REPAIR RT/LT       SEPTOPLASTY          Social History:     Social History     Tobacco Use     Smoking status: Former Smoker     Smokeless tobacco: Never Used   Vaping Use     Vaping Use: Never used   Substance Use Topics     Alcohol use: No     Drug use: No        Family History:     Family History   Problem Relation Age of Onset     Alzheimer Disease Mother      Hypertension Father      Substance Abuse Maternal Grandmother      Anxiety Disorder Maternal Grandmother      Cerebrovascular Disease Maternal Grandmother         Medications:     Current Outpatient Medications   Medication Sig     aspirin (ASA) 81 MG chewable tablet Take 81 mg by mouth     Prenatal Vit-Fe Fumarate-FA (PNV PRENATAL PLUS MULTIVITAMIN) 27-1 MG TABS per tablet Take 1 tablet by mouth daily     No current facility-administered medications for this visit.        Allergies:     Allergies   Allergen Reactions     Blood Transfusion Related (Informational Only) Other (See Comments)     Patient has a history of a clinically significant antibody against RBC antigens.  A delay in compatible RBCs may occur.         Review of Systems:   As above      Physical Exam:   Vitals: /71   Pulse 79   Wt 66.7 kg (147 lb)   LMP 03/09/2022 (Approximate)   BMI 25.00 kg/m     General: Seated comfortably in no acute distress.  HEENT: Neck supple with normal range of motion. No paracervical muscle tenderness or tightness. Optic discs sharp and vasculature normal on funduscopic exam.   Lungs: breathing comfortably  Extremities: no edema  Skin: No rashes  Neurologic:     Mental Status: Fully alert, attentive. Normal memory and fund of knowledge. Language normal, speech clear and fluent, no paraphasic errors.      Cranial Nerves: Visual fields intact. PERRL. EOMI with normal smooth pursuit. Facial sensation intact/symmetric. Facial movements symmetric. Hearing not formally tested but intact to conversation. Palate elevation symmetric, uvula midline. No dysarthria. Shoulder shrug strong bilaterally. Tongue protrusion midline.     Motor: No tremors or other abnormal movements observed. Muscle tone normal throughout. Normal/symmetric rapid finger tapping. Strength 5/5 throughout upper and lower extremities.     Deep Tendon Reflexes: 2+/symmetric throughout upper and lower extremities. No clonus. Toes downgoing bilaterally.     Sensory: Intact/symmetric to light touch throughout upper and lower extremities. Negative Romberg.      Coordination: Finger-nose-finger and heel-shin intact without dysmetria. Rapid alternating movements intact/symmetric with normal speed and rhythm.     Gait: Normal, steady casual gait. Able to walk on toes, heels and tandem without difficulty.         Data: Pertinent prior to visit   Imaging:  None    Procedures:  None    Laboratory:  UA negative for protein, CBC normal with exception of mildly low RBC, LFTs normal (7/2022)         Assessment and Plan:   Assessment:  Jeannette Sykes is a 37 year old female who presents today for evaluation of headaches in pregnancy. Patient is currently 25 weeks pregnant. Starting around 8 weeks she started getting  episodic frontal headaches. Her last episode of headaches was 2 weeks ago. She has had intermittently elevated blood pressure, which OB is following. High blood pressure is thought to more likely be related to chronic high blood pressure. She does not have signs/symptoms of pre-eclampsia. Description of headaches is most suggestive of episodic tension-type headaches. I currently have a lower concern for a secondary cause of headaches. However patient should alert us if she develops worsening headaches with more consistent high blood pressure, or headaches with diagnosis of pre-eclampsia. At this point I would recommend MRI/MRV brain imaging. She is managing with as needed ice packs for headaches. She previously didn't get benefit with tylenol and would like to avoid medications. Instructions for neck and upper back stretches were also given to patient.      Plan:  - Monitor frequency/intensity of headaches, blood pressure, signs/symptoms of pre-eclampsia and alert us if there is worsening  - Continue ice packs prn for headaches  - Increase oral hydration  - Neck / upper back stretches for tension type headaches    Follow up in Neurology clinic as needed should new concerns arise.    Frederick Cox MD   of Neurology  [facility]      The total time of this encounter today amounted to 47 minutes. This time included time spent with the patient, prep work, ordering tests, and performing post visit documentation.        Again, thank you for allowing me to participate in the care of your patient.        Sincerely,        Frederick Cox MD

## 2022-08-30 NOTE — PROGRESS NOTES
"[facility] Neurology Consultation    Jeannette Sykes MRN# [id number]   Age: 37 year old YOB: 1985     Requesting physician: Vanessa Serrano     Reason for Consultation: headaches in pregnancy      History of Presenting Symptoms:   Jeannette Sykes is a 37 year old female who presents today for evaluation of headaches in pregnancy.    Patient is currently 25 weeks pregnant. Around 8 weeks she started getting headaches. It felt like she had just had \"a kegger\". They felt like hangover headaches. The headaches seem to slowly develop in the evening. They are worse at night and then linger around during the day. She is getting headaches every few weeks and they last about a week. She treats it with ice packs. She tried tylenol once or twice but didn't get relief. Her last headache was 2 weeks. There doesn't seem to be a clear trigger for the headaches. She denies any light/sound sensitivity. She has had some nausea with pregnancy, but not related to the headaches. She denies any significant neck / upper back pain/tightness. Headaches are more frontal.    With the first pregnancy she had  at 36 weeks. Otherwise she had no complications.     With the second pregnancy she had no issues until the last 2 months when she developed high blood pressure. She wasn't given a diagnosis of pre-eclampsia. She had no issues with headaches in that pregnancy.     About a month ago she had significant issues with dizziness, described as lightheadedness with standing and needing to sit down. This is now better.    During this pregnancy she has had intermittent issues with high blood pressure. She was first noted to have high blood pressure readings at 12 weeks. She has a blood pressure cuff at home.     She had migraines in high school, but after that didn't get significant headaches.        Past Medical History:     Patient Active Problem List   Diagnosis     Female infertility     Abnormal uterine " bleeding (AUB)     History of abnormal cervical Pap smear     History of UTI     MRSA (methicillin resistant Staphylococcus aureus) infection     Need for Tdap vaccination     History of 2  sections     Multigravida of advanced maternal age in first trimester     History of pre-eclampsia     Red blood cell antibody positive     Past Medical History:   Diagnosis Date     Female infertility      Menorrhagia with regular cycle         Past Surgical History:     Past Surgical History:   Procedure Laterality Date      SECTION      x 2     ROTATOR CUFF REPAIR RT/LT       SEPTOPLASTY          Social History:     Social History     Tobacco Use     Smoking status: Former Smoker     Smokeless tobacco: Never Used   Vaping Use     Vaping Use: Never used   Substance Use Topics     Alcohol use: No     Drug use: No        Family History:     Family History   Problem Relation Age of Onset     Alzheimer Disease Mother      Hypertension Father      Substance Abuse Maternal Grandmother      Anxiety Disorder Maternal Grandmother      Cerebrovascular Disease Maternal Grandmother         Medications:     Current Outpatient Medications   Medication Sig     aspirin (ASA) 81 MG chewable tablet Take 81 mg by mouth     Prenatal Vit-Fe Fumarate-FA (PNV PRENATAL PLUS MULTIVITAMIN) 27-1 MG TABS per tablet Take 1 tablet by mouth daily     No current facility-administered medications for this visit.        Allergies:     Allergies   Allergen Reactions     Blood Transfusion Related (Informational Only) Other (See Comments)     Patient has a history of a clinically significant antibody against RBC antigens.  A delay in compatible RBCs may occur.         Review of Systems:   As above     Physical Exam:   Vitals: /71   Pulse 79   Wt 66.7 kg (147 lb)   LMP 2022 (Approximate)   BMI 25.00 kg/m     General: Seated comfortably in no acute distress.  HEENT: Neck supple with normal range of motion. No paracervical muscle  tenderness or tightness. Optic discs sharp and vasculature normal on funduscopic exam.   Lungs: breathing comfortably  Extremities: no edema  Skin: No rashes  Neurologic:     Mental Status: Fully alert, attentive. Normal memory and fund of knowledge. Language normal, speech clear and fluent, no paraphasic errors.      Cranial Nerves: Visual fields intact. PERRL. EOMI with normal smooth pursuit. Facial sensation intact/symmetric. Facial movements symmetric. Hearing not formally tested but intact to conversation. Palate elevation symmetric, uvula midline. No dysarthria. Shoulder shrug strong bilaterally. Tongue protrusion midline.     Motor: No tremors or other abnormal movements observed. Muscle tone normal throughout. Normal/symmetric rapid finger tapping. Strength 5/5 throughout upper and lower extremities.     Deep Tendon Reflexes: 2+/symmetric throughout upper and lower extremities. No clonus. Toes downgoing bilaterally.     Sensory: Intact/symmetric to light touch throughout upper and lower extremities. Negative Romberg.      Coordination: Finger-nose-finger and heel-shin intact without dysmetria. Rapid alternating movements intact/symmetric with normal speed and rhythm.     Gait: Normal, steady casual gait. Able to walk on toes, heels and tandem without difficulty.         Data: Pertinent prior to visit   Imaging:  None    Procedures:  None    Laboratory:  UA negative for protein, CBC normal with exception of mildly low RBC, LFTs normal (7/2022)         Assessment and Plan:   Assessment:  Jeannette Sykes is a 37 year old female who presents today for evaluation of headaches in pregnancy. Patient is currently 25 weeks pregnant. Starting around 8 weeks she started getting episodic frontal headaches. Her last episode of headaches was 2 weeks ago. She has had intermittently elevated blood pressure, which OB is following. High blood pressure is thought to more likely be related to chronic high blood pressure. She does  not have signs/symptoms of pre-eclampsia. Description of headaches is most suggestive of episodic tension-type headaches. I currently have a lower concern for a secondary cause of headaches. However patient should alert us if she develops worsening headaches with more consistent high blood pressure, or headaches with diagnosis of pre-eclampsia. At this point I would recommend MRI/MRV brain imaging. She is managing with as needed ice packs for headaches. She previously didn't get benefit with tylenol and would like to avoid medications. Instructions for neck and upper back stretches were also given to patient.      Plan:  - Monitor frequency/intensity of headaches, blood pressure, signs/symptoms of pre-eclampsia and alert us if there is worsening  - Continue ice packs prn for headaches  - Increase oral hydration  - Neck / upper back stretches for tension type headaches    Follow up in Neurology clinic as needed should new concerns arise.    Frederick Cox MD   of Neurology  [facility]      The total time of this encounter today amounted to 47 minutes. This time included time spent with the patient, prep work, ordering tests, and performing post visit documentation.

## 2022-09-11 ENCOUNTER — HEALTH MAINTENANCE LETTER (OUTPATIENT)
Age: 37
End: 2022-09-11

## 2022-09-18 ENCOUNTER — NURSE TRIAGE (OUTPATIENT)
Dept: NURSING | Facility: CLINIC | Age: 37
End: 2022-09-18

## 2022-09-18 NOTE — TELEPHONE ENCOUNTER
"Woke during the night with wheezing and labored breathing.  Has had a cough recently  Used albuterol MDI from two years ago with relief.  Son recently had croup.  Patient hx of asthma when younger.  Patient is 28w1d pregnant.    Per the protocol, I instructed she be seen in today in .   This likely related to viral/bacteria infection vs PE.  Caller stated understanding and agreement.  Will go to [facility] near her home.        Reason for Disposition    Pregnant or postpartum (from 0 to 6 weeks after delivery)    Additional Information    Negative: SEVERE difficulty breathing (e.g., struggling for each breath, speaks in single words, pulse > 120)    Negative: Breathing stopped and hasn't returned    Negative: Choking on something    Negative: Bluish (or gray) lips or face    Negative: Difficult to awaken or acting confused (e.g., disoriented, slurred speech)    Negative: Passed out (i.e., fainted, collapsed and was not responding)    Negative: Wheezing started suddenly after medicine, an allergic food, or bee sting    Negative: Stridor    Negative: Slow, shallow and weak breathing    Negative: Sounds like a life-threatening emergency to the triager    Negative: MODERATE difficulty breathing (e.g., speaks in phrases, SOB even at rest, pulse 100-120) of new-onset or worse than normal    Negative: Oxygen level (e.g., pulse oximetry) 90 percent or lower    Negative: Wheezing can be heard across the room    Negative: Drooling or spitting out saliva (because can't swallow)    Negative: Any history of prior \"blood clot\" in leg or lungs    Negative: Illness requiring prolonged bedrest in past month (e.g., immobilization, long hospital stay)    Negative: Hip or leg fracture (broken bone) in past month (or had cast on leg or ankle in past month)    Negative: Major surgery in the past month    Negative: Long-distance travel in past month (e.g., car, bus, train, plane; with trip lasting 6 or more hours)    Negative: " "Cancer treatment in past six months (or has cancer now)    Negative: Extra heart beats OR irregular heart beating (i.e., \"palpitations\")    Negative: Fever > 103 F (39.4 C)    Negative: Fever > 101 F (38.3 C) and over 60 years of age    Negative: Fever > 100.0 F (37.8 C) and bedridden (e.g., nursing home patient, stroke, chronic illness, recovering from surgery)    Negative: Fever > 100.0 F (37.8 C) and diabetes mellitus or weak immune system (e.g., HIV positive, cancer chemo, splenectomy, organ transplant, chronic steroids)    Negative: Periods where breathing stops and then resumes normally and bedridden (e.g., nursing home patient, CVA)    Protocols used: BREATHING DIFFICULTY-A-OH    Savanna COULTER RN [facility]     "

## 2022-09-22 ENCOUNTER — PRENATAL OFFICE VISIT (OUTPATIENT)
Dept: OBGYN | Facility: OTHER | Age: 37
End: 2022-09-22
Payer: COMMERCIAL

## 2022-09-22 ENCOUNTER — TELEPHONE (OUTPATIENT)
Dept: OBGYN | Facility: OTHER | Age: 37
End: 2022-09-22

## 2022-09-22 VITALS
SYSTOLIC BLOOD PRESSURE: 118 MMHG | HEART RATE: 86 BPM | DIASTOLIC BLOOD PRESSURE: 77 MMHG | BODY MASS INDEX: 25.39 KG/M2 | WEIGHT: 149.3 LBS

## 2022-09-22 DIAGNOSIS — Z98.891 HISTORY OF 2 CESAREAN SECTIONS: ICD-10-CM

## 2022-09-22 DIAGNOSIS — O36.0910 ANTI-E ISOIMMUNIZATION AFFECTING PREGNANCY IN FIRST TRIMESTER, SINGLE OR UNSPECIFIED FETUS: Primary | ICD-10-CM

## 2022-09-22 DIAGNOSIS — Z87.59 HISTORY OF PRE-ECLAMPSIA: ICD-10-CM

## 2022-09-22 DIAGNOSIS — Z01.812 PRE-OPERATIVE LABORATORY EXAMINATION: Primary | ICD-10-CM

## 2022-09-22 DIAGNOSIS — O09.529 ANTEPARTUM MULTIGRAVIDA OF ADVANCED MATERNAL AGE: ICD-10-CM

## 2022-09-22 DIAGNOSIS — Z30.2 ENCOUNTER FOR STERILIZATION: ICD-10-CM

## 2022-09-22 LAB
SPECIMEN EXPIRATION DATE: NORMAL
XXX BLOOD GROUP AB TITR SERPL: <1 {TITER}

## 2022-09-22 PROCEDURE — 99207 PR PRENATAL VISIT: CPT | Performed by: OBSTETRICS & GYNECOLOGY

## 2022-09-22 PROCEDURE — 36415 COLL VENOUS BLD VENIPUNCTURE: CPT | Performed by: OBSTETRICS & GYNECOLOGY

## 2022-09-22 PROCEDURE — 86870 RBC ANTIBODY IDENTIFICATION: CPT | Performed by: OBSTETRICS & GYNECOLOGY

## 2022-09-22 PROCEDURE — 86850 RBC ANTIBODY SCREEN: CPT | Performed by: OBSTETRICS & GYNECOLOGY

## 2022-09-22 PROCEDURE — 86886 COOMBS TEST INDIRECT TITER: CPT | Performed by: OBSTETRICS & GYNECOLOGY

## 2022-09-22 RX ORDER — ALBUTEROL SULFATE 90 UG/1
AEROSOL, METERED RESPIRATORY (INHALATION)
COMMUNITY
Start: 2022-09-18

## 2022-09-22 NOTE — TELEPHONE ENCOUNTER
[facility] SURGERY PLANNING/SCHEDULING WORKSHEET                                                     Jeannette Sykes                :  1985  MRN:  [id number]  Home Phone [phone]   Work Phone Not on file.   Mobile [phone]         Surgeon: Jacquelyn Joya MD    DIAGNOSIS:   37 year old  with history of , unwanted fertility    SURGICAL PROCEDURE:  Repeat  section and bilateral tubal ligation    Surgery Location:  [facility]  Patient Surgery Class:  Admission with overnight stay of 3 days  Length of Procedure:  60 minutes  Type of anesthesia:  Spinal    Multi-surgeon case: No  OR Assistant needed:   Yes  Vendor needed: No  Positioning:  Supine  Laterality:  Bilateral  Date requested:  39 - 40 weeks for her CS.  She would like Tuesday Dec 6th    Special Equipment: None  Special Instructions for patient:  Per the [facility] [place] [place] Pre-Admission Nurse when they call the patient prior to the surgery date.   Precautions:  NONE  :  NOT NEEDED    Sterilization consent:  Yes and was signed on 22.    Preop: Pre-op options: Surgeon - 15 min ok  Pre-surgery consult needed:  Not applicable.  Postop evaluation needed:  6 weeks    ALLERGIES:   Allergies   Allergen Reactions     Blood Transfusion Related (Informational Only) Other (See Comments)     Patient has a history of a clinically significant antibody against RBC antigens.  A delay in compatible RBCs may occur.       BMI:There is no height or weight on file to calculate BMI.      The proposed surgical procedure is considered INTERMEDIATE risk.      Jacquelyn Joya MD    2022

## 2022-09-22 NOTE — TELEPHONE ENCOUNTER
[facility] SURGERY PLANNING/SCHEDULING WORKSHEET                                                     Jeannette Sykes                :  1985  MRN:  [id number]  Home Phone [phone]   Work Phone Not on file.   Mobile [phone]         Surgeon: Jacquelyn Joya MD     DIAGNOSIS:   37 year old  with history of , unwanted fertility     SURGICAL PROCEDURE:  Repeat  section and bilateral tubal ligation     Surgery Location:  [facility]  Patient Surgery Class:  Admission with overnight stay of 3 days  Length of Procedure:  60 minutes  Type of anesthesia:  Spinal     Multi-surgeon case: No  OR Assistant needed:   Yes  Vendor needed: No  Positioning:  Supine  Laterality:  Bilateral  Date requested:  39 - 40 weeks for her CS.  She would like Tuesday Dec 6th     Special Equipment: None  Special Instructions for patient:  Per the [facility] Pre-Admission Nurse when they call the patient prior to the surgery date.   Precautions:  NONE  :  NOT NEEDED     Sterilization consent:  Yes and was signed on 22.     Preop: Pre-op options: Surgeon - 15 min ok  Pre-surgery consult needed:  Not applicable.  Postop evaluation needed:  6 weeks     ALLERGIES:         Allergies   Allergen Reactions     Blood Transfusion Related (Informational Only) Other (See Comments)       Patient has a history of a clinically significant antibody against RBC antigens.  A delay in compatible RBCs may occur.       BMI:There is no height or weight on file to calculate BMI.       The proposed surgical procedure is considered INTERMEDIATE risk.        Jacquelyn Joya MD    2022  SURGERY SCHEDULING AND PRECERTIFICATION    Medical Record Number: [id number]  Jeannette Sykes  YOB: 1985   Phone: [phone] (home)   Primary Provider: Vanessa Hernandez    Reason for Admit:  ICD-10 CODE:  Z98.891 & Z30.2    Surgeon: Jacquelyn Joya MD  Surgical Procedure: Repeat  Section &  Tubal    Date of Surgery 12/06/22 Time of Surgery 7:30am  Surgery to be performed at:  [facility]  Status: Inpatient- Length of stay:  3 days.  Type of Anesthesia Anticipated: Spinal     Sterilization consent:  Yes and was signed on 08/25/22.    Pre-Op: On 11/28 with Dr Joya at [facility]  COVID testing:  The Covid test has been scheduled for 12/02/22 at [facility] at 3:45pm.  Post-Op:  6 weeks patient to schedule as Providers schedule is not out for January yet    Pre-certification routed to [employer]:  Yes    Surgery packet mailed to patient's home address: Yes  Patient instructed NPO 12 hours prior to surgery, arrive 1 hour 45 minutes prior to surgery, must have a .  Patient understood and agrees to the plan.      Requestor:  Evangelina Lawrence     Location:  [facility] [phone]

## 2022-09-22 NOTE — PROGRESS NOTES
Presents for routine  appointment.  She and her son have croup. She has congestion and cough.  O2 100% at . Using inhaler.  Blood pressures have been normal at home. She has seen the neurologist.  Note reviewed.   No abnormal discharge, no leaking fluid, no contraction, no vaginal bleeding   ROS:   and GI negative.     Please see Prenatal Vitals and Notes Flowsheet for objective data.  Hemoglobin   Date Value Ref Range Status   2022 11.3 (L) 11.7 - 15.7 g/dL Final       A/P:  37 year old  at 28w5d       ICD-10-CM    1. 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 History of 2  sections  Z98.891    3. History of pre-eclampsia  Z87.59    4. Antepartum multigravida of advanced maternal age  O09.529        GCT Normal  Rhogam: not needed  Discussed kick counts   TDAP  next visit.    History of preE - low dose aspirin for preeclampsia risk reduction   History of  x2, plan repeat with tubal.  Tubal consent signed last visit  Estimated Date of Delivery: Dec 10, 2022 - she will be 39 weeks Saturday Dec 3.  Plan for dec 6.    Anti-E - continue monthly titers  1st preg was c/b oligo and SGA.  Uncomplicated 2nd pregnancy.BPP starting at 32 weeks once weekly with us per MFM, pt will continue growths every 4 weeks at [facility].  Follow up in 2 weeks.      Jacquelyn Joya MD

## 2022-09-22 NOTE — PATIENT INSTRUCTIONS
If you have labs or imaging done, the results will automatically release in [employer] without an interpretation.  Your health care professional will review those results and send an interpretation with recommendations as soon as possible, but this may be 1-3 business days.    If you have any questions regarding your visit, please contact your care team.     [employer] Access Services: [phone]  [facility] CLINIC HOURS TELEPHONE NUMBER       Jacquelyn Joya MD  Assistant Medical Director    Megan - Certified Medical Assistant     Sparkle Levine-HERNÁN Burgess-  Mirella-     Monday- [place]  8:00 a.m - 5:00 p.m    Tuesday- Surgery        Thursday- [place]  8:00 a.m - 5:00 p.m.    Friday- [place]  7:30 a.m - 4:00 p.m. [facility]  [address]  [place] [address]  [phone] Fax  Imaging Scheduling [phone]    [facility] Labor and Delivery  [address] [place] [address] Dr.  [place], [place] [address] [phone]    [facility]  [address] [facility] [place], [place] [address] [phone] Fax  Imaging Scheduling [phone]     Urgent Care locations:  [facility] Monday-Friday  10 am - 8 pm  Saturday and Sunday   9 am - 5 pm  Monday-Friday   10 am - 8 pm  Saturday and Sunday   9 am - 5 pm   [phone]     **Surgeries** Our Surgery Schedulers will contact you to schedule. If you do not receive a call within 3 business days, please call [phone].    If you need a medication refill, please contact your pharmacy. Please allow 3 business days for your refill to be completed.    As always, thank you for trusting us with your healthcare needs!

## 2022-09-23 NOTE — TELEPHONE ENCOUNTER
PB DOS: 2022 *[facility]  Type of Procedure: Repeat  Section & Tubal  CPT Codes: 58830  ICD10 Codes: Z98.891 & Z30.2  Surgeon/Ordering provider: Dr. Joya  Pre-cert/Authorization completed:  No PA Required  Payer: [employer]  Spoke to [employer] PA list  Ref. # / Auth #   Valid Dates:     Surgery form and last office note faxed to [facility].

## 2022-10-06 ENCOUNTER — PRENATAL OFFICE VISIT (OUTPATIENT)
Dept: OBGYN | Facility: OTHER | Age: 37
End: 2022-10-06
Payer: COMMERCIAL

## 2022-10-06 VITALS
DIASTOLIC BLOOD PRESSURE: 85 MMHG | HEART RATE: 82 BPM | HEIGHT: 64 IN | WEIGHT: 155.9 LBS | BODY MASS INDEX: 26.61 KG/M2 | SYSTOLIC BLOOD PRESSURE: 131 MMHG

## 2022-10-06 DIAGNOSIS — O36.0910 ANTI-E ISOIMMUNIZATION AFFECTING PREGNANCY IN FIRST TRIMESTER, SINGLE OR UNSPECIFIED FETUS: Primary | ICD-10-CM

## 2022-10-06 DIAGNOSIS — Z23 NEED FOR TDAP VACCINATION: ICD-10-CM

## 2022-10-06 PROCEDURE — 99207 PR PRENATAL VISIT: CPT | Performed by: OBSTETRICS & GYNECOLOGY

## 2022-10-06 RX ORDER — ACETAMINOPHEN 500 MG
1000 TABLET ORAL
COMMUNITY

## 2022-10-06 NOTE — PATIENT INSTRUCTIONS
If you have labs or imaging done, the results will automatically release in [employer] without an interpretation.  Your health care professional will review those results and send an interpretation with recommendations as soon as possible, but this may be 1-3 business days.    If you have any questions regarding your visit, please contact your care team.     [employer] Access Services: [phone]  [facility] CLINIC HOURS TELEPHONE NUMBER       Jaqcuelyn Joya MD  Assistant Medical Director    Megan - Certified Medical Assistant     Sparkle Levine-HERNÁN Burgess-  Mirella-     Monday- [place]  8:00 a.m - 5:00 p.m    Tuesday- Surgery        Thursday- [place]  8:00 a.m - 5:00 p.m.    Friday- [place]  7:30 a.m - 4:00 p.m. [facility]  [address]  [place] [address]  [phone] Fax  Imaging Scheduling [phone]    [facility] Labor and Delivery  [address] [place] [address] Dr.  [place], [place] [address] [phone]    [facility]  [address] [facility] [facility] [place], [place] [address] [phone] Fax  Imaging Scheduling [phone]     Urgent Care locations:  [facility] Monday-Friday  10 am - 8 pm  Saturday and Sunday   9 am - 5 pm  Monday-Friday   10 am - 8 pm  Saturday and Sunday   9 am - 5 pm   [phone]     **Surgeries** Our Surgery Schedulers will contact you to schedule. If you do not receive a call within 3 business days, please call [phone].    If you need a medication refill, please contact your pharmacy. Please allow 3 business days for your refill to be completed.    As always, thank you for trusting us with your healthcare needs!

## 2022-10-06 NOTE — PROGRESS NOTES
Presents for routine  appointment.  She was recently evaluated at L&D for elevated BPs at home.  Normal labs and BPs in the 130s for the most part, so she was discharged home.  She continues to have BP in the mid 130s/80s at homes.   Cold symptoms. Otherwise no headaches. Feeling a little more swelling   No abnormal discharge , no leaking fluid , no contractions , no vaginal bleeding    ROS:   and GI  negative.     Please see Prenatal Vitals and Notes Flowsheet for objective data.    A/P:  37 year old  at 30w5d       ICD-10-CM    1. 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   History of preE - low dose aspirin for preeclampsia risk reduction   History of  x2, plan repeat with tubal for dec 6.    Anti-E - continue monthly titers  1st preg was c/b oligo and SGA.  Uncomplicated 2nd pregnancy.BPP starting at 32 weeks once weekly with us per [facility], pt will continue growths every 4 weeks at [facility].    Follow up in 2 weeks.      Jacquelyn Joya MD

## 2022-10-11 ENCOUNTER — NURSE TRIAGE (OUTPATIENT)
Dept: OBGYN | Facility: OTHER | Age: 37
End: 2022-10-11

## 2022-10-11 NOTE — TELEPHONE ENCOUNTER
Spoke with patient. Has been having BPs in the 140s-150s  Did have a 160 yesterday but within an hour she was down to 140s again. Right now its 148/92   Was seen in L&D last week 10/03/2022  Last ob check was 10/06/2022    No symptoms.  Swelling is about the same in the tops of feet and hands since last visit on 10/06/2022.   No abnormal discharge , no leaking fluid , no contractions, no vaginal bleeding.     Patient declines going to L & D, would like to just have the labs drawn.      Will route to OBGYN team for further advice.    Clemente Felix, CECILN, RN, PHN  [facility] ~ Registered Nurse  Clinic Triage ~ [place] [facility] [place]  October 11, 2022      Reason for Disposition    Pregnant > 20 weeks and BP > 140/90    Additional Information    Negative: Pregnant > 20 weeks or postpartum (< 6 weeks after delivery) and new hand or face swelling    Negative: Sounds like a life-threatening emergency to the triager    Protocols used: HIGH BLOOD PRESSURE-A-[place]

## 2022-10-11 NOTE — TELEPHONE ENCOUNTER
31w3d,  last seen 10/6/22 with [facility] for prenatal.    Pt reporting elevated BP's at home yesterday and today. Ranging from 135-160/. She has some generalized swelling in hands, feet and face, says it is same as it was on 10/6 has not increased. Denies HA, vision changes, abdominal pain or nausea.  Pt not on BP meds.    Hx of preE, on aspirin, hx of cs x2, will have repeat. Pt seen in ED 10/3 for elevated BP's at home. ALT, AST, platelets and protein/creat WNL.    Spoke with on call provider. Advised for pt to be seen in L&D, will triage her there. Pt aware and has agreed to go see L&D.    Lidia Vidales RN on 10/11/2022 at 4:43 PM

## 2022-10-13 ENCOUNTER — NURSE TRIAGE (OUTPATIENT)
Dept: NURSING | Facility: CLINIC | Age: 37
End: 2022-10-13

## 2022-10-13 NOTE — TELEPHONE ENCOUNTER
"Reviewed note from the triage RN in [facility] Epic - \"BP's 120-140/70-90.  H labs drawn and normal.... \"Pt denies HA, blurred vision, or epigastric discomfort.\"   Plan to repeat her labs at her next apt on the .   She should continue to monitor her BPs at home, but not to check them \"right after walking\" like she did below.  There is a BP clinical reference that can be sent to her so she takes them appropriately.  She should return to L&D if she develops headache, vision changes, epigastric discomfort, etc. or if she has blood pressures in the 160s on the top number or 110s on the bottom number.  We will discuss additional plan at her next visit  I will work on moving her  to 37 weeks.  "

## 2022-10-13 NOTE — TELEPHONE ENCOUNTER
Nurse Triage SBAR    Situation:   Maternal hypertension    Background:   -BP issues - with recent hospitalization    Assessment:   -Denies triage at this time  -/92 (right after waking)  -No symptoms of hypertension at this time   -She was told to follow up today with  [facility], by the providers in the hospital  -She are requesting a phone call from her to see how to follow up regarding lab work (for next  Monday or Tuesday) and future appointments (as they  May need to be weekly at this point)   -Next appointment is on 10/20/22    Recommendation:   -Please call patient back and give recommendations regarding any need lab work or follow up at [phone]. It is okay to leave a detailed message at this number.     ESTUARDO DIANE RN on 10/13/2022 at 7:41 AM    Reason for Disposition    [1] Caller requesting NON-URGENT health information AND [2] PCP's office is the best resource    Protocols used: INFORMATION ONLY CALL - NO TRIAGE-A-

## 2022-10-13 NOTE — TELEPHONE ENCOUNTER
Relayed Dr. Joya's recommendations below to pt.  Discussed with her do's and dont's of taking a BP.      Pt verbalized understanding and agreed to plan.    This encounter has also been forwarded to surg scheduling to move pt's  up to when she is 37 weeks.    Julianna Langford RN

## 2022-10-17 NOTE — PATIENT INSTRUCTIONS
Return for labs again next week Thursday or Friday.  Send me a [employer] message with your blood pressures on Tuesday.                If you have labs or imaging done, the results will automatically release in [employer] without an interpretation.  Your health care professional will review those results and send an interpretation with recommendations as soon as possible, but this may be 1-3 business days.    If you have any questions regarding your visit, please contact your care team.     [employer] Access Services: [phone]  [facility] CLINIC HOURS TELEPHONE NUMBER       Jacquelyn Joya MD  Assistant Medical Director    Megan - Certified Medical Assistant     Roxy DIAZ RN  Julianna-HERNÁN Murillo-HERNÁN Kirk-  Mirella-     Monday- [place]  8:00 a.m - 5:00 p.m    Tuesday- Surgery        Thursday- [place]  8:00 a.m - 5:00 p.m.    Friday- [place]  7:30 a.m - 4:00 p.m. [facility]  [address]  [place] [address]  [phone] Fax  Imaging Scheduling [phone]    [facility] Labor and Delivery  [address] [place] [address] Dr.  [place], [place] [address] [phone]    [address] [address]  [phone] Fax  Imaging Scheduling [phone]     Urgent Care locations:  [facility] Monday-Friday  10 am - 8 pm  Saturday and Sunday   9 am - 5 pm  Monday-Friday   10 am - 8 pm  Saturday and Sunday   9 am - 5 pm   [phone]     **Surgeries** Our Surgery Schedulers will contact you to schedule. If you do not receive a call within 3 business days, please call [phone].    If you need a medication refill, please contact your pharmacy. Please allow 3 business days for your refill to be completed.    As always, thank you for trusting us with your healthcare needs!

## 2022-10-20 ENCOUNTER — PRENATAL OFFICE VISIT (OUTPATIENT)
Dept: OBGYN | Facility: OTHER | Age: 37
End: 2022-10-20
Payer: COMMERCIAL

## 2022-10-20 VITALS
SYSTOLIC BLOOD PRESSURE: 147 MMHG | DIASTOLIC BLOOD PRESSURE: 97 MMHG | HEART RATE: 80 BPM | BODY MASS INDEX: 26.95 KG/M2 | WEIGHT: 157 LBS

## 2022-10-20 DIAGNOSIS — Z23 NEED FOR VACCINATION: ICD-10-CM

## 2022-10-20 DIAGNOSIS — O36.0910 ANTI-E ISOIMMUNIZATION AFFECTING PREGNANCY IN FIRST TRIMESTER, SINGLE OR UNSPECIFIED FETUS: ICD-10-CM

## 2022-10-20 DIAGNOSIS — O13.3 GESTATIONAL HYPERTENSION, THIRD TRIMESTER: Primary | ICD-10-CM

## 2022-10-20 LAB
ALT SERPL W P-5'-P-CCNC: 12 U/L (ref 0–50)
AST SERPL W P-5'-P-CCNC: 10 U/L (ref 0–45)
CREAT SERPL-MCNC: 1.09 MG/DL (ref 0.52–1.04)
CREAT UR-MCNC: 70 MG/DL
ERYTHROCYTE [DISTWIDTH] IN BLOOD BY AUTOMATED COUNT: 13.2 % (ref 10–15)
GFR SERPL CREATININE-BSD FRML MDRD: 67 ML/MIN/1.73M2
HCT VFR BLD AUTO: 35.4 % (ref 35–47)
HGB BLD-MCNC: 11.9 G/DL (ref 11.7–15.7)
MCH RBC QN AUTO: 30.1 PG (ref 26.5–33)
MCHC RBC AUTO-ENTMCNC: 33.6 G/DL (ref 31.5–36.5)
MCV RBC AUTO: 90 FL (ref 78–100)
PLATELET # BLD AUTO: 252 10E3/UL (ref 150–450)
PROT UR-MCNC: 0.15 G/L
PROT/CREAT 24H UR: 0.21 G/G CR (ref 0–0.2)
RBC # BLD AUTO: 3.95 10E6/UL (ref 3.8–5.2)
SPECIMEN EXPIRATION DATE: NORMAL
URATE SERPL-MCNC: 4.7 MG/DL (ref 2.6–6)
WBC # BLD AUTO: 9.3 10E3/UL (ref 4–11)
XXX BLOOD GROUP AB TITR SERPL: <1 {TITER}

## 2022-10-20 PROCEDURE — 84550 ASSAY OF BLOOD/URIC ACID: CPT | Performed by: OBSTETRICS & GYNECOLOGY

## 2022-10-20 PROCEDURE — 86886 COOMBS TEST INDIRECT TITER: CPT | Performed by: OBSTETRICS & GYNECOLOGY

## 2022-10-20 PROCEDURE — 84156 ASSAY OF PROTEIN URINE: CPT | Performed by: OBSTETRICS & GYNECOLOGY

## 2022-10-20 PROCEDURE — 86870 RBC ANTIBODY IDENTIFICATION: CPT | Performed by: OBSTETRICS & GYNECOLOGY

## 2022-10-20 PROCEDURE — 90715 TDAP VACCINE 7 YRS/> IM: CPT | Performed by: OBSTETRICS & GYNECOLOGY

## 2022-10-20 PROCEDURE — 99207 PR PRENATAL VISIT: CPT | Performed by: OBSTETRICS & GYNECOLOGY

## 2022-10-20 PROCEDURE — 84460 ALANINE AMINO (ALT) (SGPT): CPT | Performed by: OBSTETRICS & GYNECOLOGY

## 2022-10-20 PROCEDURE — 36415 COLL VENOUS BLD VENIPUNCTURE: CPT | Performed by: OBSTETRICS & GYNECOLOGY

## 2022-10-20 PROCEDURE — 85027 COMPLETE CBC AUTOMATED: CPT | Performed by: OBSTETRICS & GYNECOLOGY

## 2022-10-20 PROCEDURE — 86850 RBC ANTIBODY SCREEN: CPT | Performed by: OBSTETRICS & GYNECOLOGY

## 2022-10-20 PROCEDURE — 82565 ASSAY OF CREATININE: CPT | Performed by: OBSTETRICS & GYNECOLOGY

## 2022-10-20 PROCEDURE — 84450 TRANSFERASE (AST) (SGOT): CPT | Performed by: OBSTETRICS & GYNECOLOGY

## 2022-10-20 PROCEDURE — 90471 IMMUNIZATION ADMIN: CPT | Performed by: OBSTETRICS & GYNECOLOGY

## 2022-10-20 RX ORDER — LABETALOL 100 MG/1
100 TABLET, FILM COATED ORAL 2 TIMES DAILY
Qty: 60 TABLET | Refills: 1 | Status: SHIPPED | OUTPATIENT
Start: 2022-10-20 | End: 2022-10-24

## 2022-10-20 NOTE — PROGRESS NOTES
Presents for routine  appointment.  BPs at home are 140s over 90s.  Since her last visit she has been seen in triage at [facility] 10/12/22.  Normal Labs, no signs of preE.  She was seen at  the day prior 10/11/22 again for elevated BPs at home.  Normal labs.  BPs in the mild range.  She was seen the week prior on 10/3/22 for home BPs of 160/100.  Bps at triage with normal to mild range and labs normal.      She has cold symptoms. No vision changes today.  No indigestion or upper abdominal pain.  Pelvic pain, tolerable.   No abnormal discharge, no leaking fluid, no contractions, no vaginal bleeding    ROS:   and GI negative.     Please see Prenatal Vitals and Notes Flowsheet for objective data.    A/P:  37 year old  at 32w5d       ICD-10-CM    1. Gestational hypertension, third trimester  O13.3 CBC with platelets     Protein  random urine     AST     ALT     Creatinine     Uric acid     labetalol (NORMODYNE) 100 MG tablet     Uric acid     Creatinine     ALT     AST     Protein  random urine     CBC with platelets      2. Need for vaccination  Z23 TDAP VACCINE (Adacel, Boostrix)      3. 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  GHTN and History of preE - low dose aspirin for preeclampsia risk reduction   History of  x2, plan repeat with tubal for dec 6 -->  Will work on moving this to 37 weeks.  She prefers the .  Unfortunately there are already 5 CS on Monday, Tuesday, and Wednesday.    Anti-E - continue monthly titers  1st preg was c/b oligo and SGA.  Uncomplicated 2nd pregnancy.BPP starting at 32 weeks once weekly with us per [facility], pt will continue growths every 4 weeks at [facility].    Plan to start labetalol 100 bid.    Follow up in 2 weeks.      Jacquelyn Joya MD      Addendum - 10/21/22 - PCR still normal, but significant bump in serum creatinine.  Recommend she  go to triage for repeat labs, serial BPs, possibly overnight to rule out severe features.  If severe features present, then inpatient management recommended.  [facility] [place].  Discussed with the charge nurse and there is no way they can take her at this time.  [place] is closed.

## 2022-10-21 ENCOUNTER — MYC MEDICAL ADVICE (OUTPATIENT)
Dept: OBGYN | Facility: OTHER | Age: 37
End: 2022-10-21

## 2022-10-21 ENCOUNTER — HOSPITAL ENCOUNTER (OUTPATIENT)
Facility: CLINIC | Age: 37
End: 2022-10-21
Admitting: OBSTETRICS & GYNECOLOGY
Payer: COMMERCIAL

## 2022-10-21 ENCOUNTER — HOSPITAL ENCOUNTER (OUTPATIENT)
Facility: CLINIC | Age: 37
Discharge: HOME | End: 2022-10-21
Attending: OBSTETRICS & GYNECOLOGY | Admitting: OBSTETRICS & GYNECOLOGY
Payer: COMMERCIAL

## 2022-10-21 VITALS
HEIGHT: 64 IN | RESPIRATION RATE: 16 BRPM | DIASTOLIC BLOOD PRESSURE: 80 MMHG | TEMPERATURE: 98.9 F | SYSTOLIC BLOOD PRESSURE: 130 MMHG | BODY MASS INDEX: 26.46 KG/M2 | WEIGHT: 155 LBS | HEART RATE: 99 BPM

## 2022-10-21 LAB
ALBUMIN SERPL-MCNC: 2.4 G/DL (ref 3.4–5)
ALP SERPL-CCNC: 223 U/L (ref 40–150)
ALT SERPL W P-5'-P-CCNC: 13 U/L (ref 0–50)
ANION GAP SERPL CALCULATED.3IONS-SCNC: 9 MMOL/L (ref 3–14)
ANTIBODY SCREEN: POSITIVE
AST SERPL W P-5'-P-CCNC: 10 U/L (ref 0–45)
BILIRUB SERPL-MCNC: 0.5 MG/DL (ref 0.2–1.3)
BUN SERPL-MCNC: 8 MG/DL (ref 7–30)
CALCIUM SERPL-MCNC: 8.7 MG/DL (ref 8.5–10.1)
CHLORIDE BLD-SCNC: 108 MMOL/L (ref 94–109)
CO2 SERPL-SCNC: 19 MMOL/L (ref 20–32)
CREAT SERPL-MCNC: 0.72 MG/DL (ref 0.52–1.04)
CREAT UR-MCNC: 43 MG/DL
ERYTHROCYTE [DISTWIDTH] IN BLOOD BY AUTOMATED COUNT: 12.7 % (ref 10–15)
GFR SERPL CREATININE-BSD FRML MDRD: >90 ML/MIN/1.73M2
GLUCOSE BLD-MCNC: 78 MG/DL (ref 70–99)
HCT VFR BLD AUTO: 32.5 % (ref 35–47)
HGB BLD-MCNC: 11.3 G/DL (ref 11.7–15.7)
MCH RBC QN AUTO: 30.5 PG (ref 26.5–33)
MCHC RBC AUTO-ENTMCNC: 34.8 G/DL (ref 31.5–36.5)
MCV RBC AUTO: 88 FL (ref 78–100)
PLATELET # BLD AUTO: 221 10E3/UL (ref 150–450)
POTASSIUM BLD-SCNC: 3.7 MMOL/L (ref 3.4–5.3)
PROT SERPL-MCNC: 6.5 G/DL (ref 6.8–8.8)
PROT UR-MCNC: 0.08 G/L
PROT/CREAT 24H UR: 0.19 G/G CR (ref 0–0.2)
RBC # BLD AUTO: 3.71 10E6/UL (ref 3.8–5.2)
SODIUM SERPL-SCNC: 136 MMOL/L (ref 133–144)
SPECIMEN EXPIRATION DATE: ABNORMAL
URATE SERPL-MCNC: 4.6 MG/DL (ref 2.6–6)
WBC # BLD AUTO: 10.4 10E3/UL (ref 4–11)

## 2022-10-21 PROCEDURE — 85027 COMPLETE CBC AUTOMATED: CPT | Performed by: OBSTETRICS & GYNECOLOGY

## 2022-10-21 PROCEDURE — 80053 COMPREHEN METABOLIC PANEL: CPT | Performed by: OBSTETRICS & GYNECOLOGY

## 2022-10-21 PROCEDURE — 84550 ASSAY OF BLOOD/URIC ACID: CPT | Performed by: OBSTETRICS & GYNECOLOGY

## 2022-10-21 PROCEDURE — 36415 COLL VENOUS BLD VENIPUNCTURE: CPT | Performed by: OBSTETRICS & GYNECOLOGY

## 2022-10-21 PROCEDURE — 84156 ASSAY OF PROTEIN URINE: CPT | Performed by: OBSTETRICS & GYNECOLOGY

## 2022-10-21 PROCEDURE — 59025 FETAL NON-STRESS TEST: CPT

## 2022-10-21 PROCEDURE — G0463 HOSPITAL OUTPT CLINIC VISIT: HCPCS | Mod: 25

## 2022-10-21 RX ORDER — ONDANSETRON 4 MG/1
4 TABLET, ORALLY DISINTEGRATING ORAL EVERY 6 HOURS PRN
Status: DISCONTINUED | OUTPATIENT
Start: 2022-10-21 | End: 2022-10-21 | Stop reason: HOSPADM

## 2022-10-21 RX ORDER — PROCHLORPERAZINE MALEATE 5 MG
10 TABLET ORAL EVERY 6 HOURS PRN
Status: DISCONTINUED | OUTPATIENT
Start: 2022-10-21 | End: 2022-10-21 | Stop reason: HOSPADM

## 2022-10-21 RX ORDER — PROCHLORPERAZINE 25 MG
25 SUPPOSITORY, RECTAL RECTAL EVERY 12 HOURS PRN
Status: DISCONTINUED | OUTPATIENT
Start: 2022-10-21 | End: 2022-10-21 | Stop reason: HOSPADM

## 2022-10-21 RX ORDER — METOCLOPRAMIDE 10 MG/1
10 TABLET ORAL EVERY 6 HOURS PRN
Status: DISCONTINUED | OUTPATIENT
Start: 2022-10-21 | End: 2022-10-21 | Stop reason: HOSPADM

## 2022-10-21 RX ORDER — ONDANSETRON 2 MG/ML
4 INJECTION INTRAMUSCULAR; INTRAVENOUS EVERY 6 HOURS PRN
Status: DISCONTINUED | OUTPATIENT
Start: 2022-10-21 | End: 2022-10-21 | Stop reason: HOSPADM

## 2022-10-21 RX ORDER — METOCLOPRAMIDE HYDROCHLORIDE 5 MG/ML
10 INJECTION INTRAMUSCULAR; INTRAVENOUS EVERY 6 HOURS PRN
Status: DISCONTINUED | OUTPATIENT
Start: 2022-10-21 | End: 2022-10-21 | Stop reason: HOSPADM

## 2022-10-21 ASSESSMENT — ACTIVITIES OF DAILY LIVING (ADL)
ADLS_ACUITY_SCORE: 18
ADLS_ACUITY_SCORE: 18

## 2022-10-21 NOTE — PLAN OF CARE
Blood pressures within normal range. Labs reviewed with Dr. Sarah. Discharge orders received. Follow up with Dr. Joya next week as scheduled. Reviewed discharge instructions with pt verbalized understanding and plan of care.

## 2022-10-21 NOTE — PLAN OF CARE
Pt arrived to labor and delivery for blood pressure elevation and repeat labs. EUM/US monitors placed. Pt denies any headaches, dizziness or visual disturbances at this time. Dr. Sarah at beside at this time.

## 2022-10-21 NOTE — TELEPHONE ENCOUNTER
, 32w6d.    Pt took her first dose of labetalol 100 mg (for gestational HTN) last night and an hour later felt anxious, nervous, panicky, and shaky and dazed.  She still has these symptoms this morning and hasn't taken another dose yet.  Her BP was 128/76 this morning.  Pt is unsure if she wants to take her morning dose of labetalol which might increase these symptoms.    Advised to hold off on morning dose of labetalol until we hear further from Dr. Joya.    Routing to Dr. Joya for further recommendations.    Julianna Langford, RN

## 2022-10-21 NOTE — DISCHARGE INSTRUCTIONS
Discharge Instruction for Undelivered Patients      You were seen for: evaluation of blood pressure, labs, fetal monitoring  We Consulted: Dr. Sarah  You had (Test or Medicine):NST, labs serial blood pressures    Diet: regular     Activity:as tolerated    Call your provider if you notice:  Swelling in your face or increased swelling in your hands or legs.  Headaches that are not relieved by Tylenol (acetaminophen).  Changes in your vision (blurring: seeing spots or stars.)  Nausea (sick to your stomach) and vomiting (throwing up).   Weight gain of 5 pounds or more per week.  Heartburn that doesn't go away.  Signs of bladder infection: pain when you urinate (use the toilet), need to go more often and more urgently.  The bag of sun (rupture of membranes) breaks, or you notice leaking in your underwear.  Bright red blood in your underwear.  Abdominal (lower belly) or stomach pain.  For first baby: Contractions (tightening) less than 5 minutes apart for one hour or more.  Second (plus) baby: Contractions (tightening) less than 10 minutes apart and getting stronger.  *If less than 34 weeks: Contractions (tightening) more than 6 times in one hour.  Increase or change in vaginal discharge (note the color and amount)  Other:     Follow-up: Dr. Joya as scheduled next week

## 2022-10-21 NOTE — TELEPHONE ENCOUNTER
Patient will be triaged at [facility] for serial BPs, labs per [facility] recs.  Dr. Sarah updated and charge nurse aware.

## 2022-10-24 ENCOUNTER — PRENATAL OFFICE VISIT (OUTPATIENT)
Dept: OBGYN | Facility: CLINIC | Age: 37
End: 2022-10-24
Payer: COMMERCIAL

## 2022-10-24 ENCOUNTER — TELEPHONE (OUTPATIENT)
Dept: OBGYN | Facility: OTHER | Age: 37
End: 2022-10-24

## 2022-10-24 VITALS
BODY MASS INDEX: 26.74 KG/M2 | WEIGHT: 155.8 LBS | SYSTOLIC BLOOD PRESSURE: 152 MMHG | DIASTOLIC BLOOD PRESSURE: 102 MMHG

## 2022-10-24 DIAGNOSIS — O36.0910 ANTI-E ISOIMMUNIZATION AFFECTING PREGNANCY IN FIRST TRIMESTER, SINGLE OR UNSPECIFIED FETUS: Primary | ICD-10-CM

## 2022-10-24 DIAGNOSIS — Z30.2 ENCOUNTER FOR STERILIZATION: ICD-10-CM

## 2022-10-24 DIAGNOSIS — Z87.59 HISTORY OF PRE-ECLAMPSIA: ICD-10-CM

## 2022-10-24 DIAGNOSIS — O13.3 GESTATIONAL HYPERTENSION, THIRD TRIMESTER: ICD-10-CM

## 2022-10-24 DIAGNOSIS — Z98.891 HISTORY OF 2 CESAREAN SECTIONS: ICD-10-CM

## 2022-10-24 DIAGNOSIS — Z23 NEED FOR PROPHYLACTIC VACCINATION AND INOCULATION AGAINST INFLUENZA: ICD-10-CM

## 2022-10-24 LAB
ALBUMIN MFR UR ELPH: 6.8 MG/DL
ALBUMIN SERPL-MCNC: 3 G/DL (ref 3.4–5)
ALP SERPL-CCNC: 303 U/L (ref 40–150)
ALT SERPL W P-5'-P-CCNC: 15 U/L (ref 0–50)
ANION GAP SERPL CALCULATED.3IONS-SCNC: 7 MMOL/L (ref 3–14)
ANTIBODY ID: NORMAL
AST SERPL W P-5'-P-CCNC: 15 U/L (ref 0–45)
BILIRUB SERPL-MCNC: 0.3 MG/DL (ref 0.2–1.3)
BUN SERPL-MCNC: 9 MG/DL (ref 7–30)
CALCIUM SERPL-MCNC: 9.6 MG/DL (ref 8.5–10.1)
CHLORIDE BLD-SCNC: 106 MMOL/L (ref 94–109)
CO2 SERPL-SCNC: 25 MMOL/L (ref 20–32)
CREAT SERPL-MCNC: 0.72 MG/DL (ref 0.52–1.04)
CREAT UR-MCNC: 39.4 MG/DL
ERYTHROCYTE [DISTWIDTH] IN BLOOD BY AUTOMATED COUNT: 12.7 % (ref 10–15)
GFR SERPL CREATININE-BSD FRML MDRD: >90 ML/MIN/1.73M2
GLUCOSE BLD-MCNC: 96 MG/DL (ref 70–99)
HCT VFR BLD AUTO: 39.3 % (ref 35–47)
HGB BLD-MCNC: 13.3 G/DL (ref 11.7–15.7)
MCH RBC QN AUTO: 30.6 PG (ref 26.5–33)
MCHC RBC AUTO-ENTMCNC: 33.8 G/DL (ref 31.5–36.5)
MCV RBC AUTO: 90 FL (ref 78–100)
PLATELET # BLD AUTO: 292 10E3/UL (ref 150–450)
POTASSIUM BLD-SCNC: 3.6 MMOL/L (ref 3.4–5.3)
PROT SERPL-MCNC: 8.1 G/DL (ref 6.8–8.8)
PROT/CREAT 24H UR: 0.17 MG/MG CR (ref 0–0.2)
RBC # BLD AUTO: 4.35 10E6/UL (ref 3.8–5.2)
SODIUM SERPL-SCNC: 138 MMOL/L (ref 133–144)
SPECIMEN EXPIRATION DATE: NORMAL
WBC # BLD AUTO: 13.2 10E3/UL (ref 4–11)

## 2022-10-24 PROCEDURE — 85027 COMPLETE CBC AUTOMATED: CPT | Performed by: OBSTETRICS & GYNECOLOGY

## 2022-10-24 PROCEDURE — 84156 ASSAY OF PROTEIN URINE: CPT | Performed by: OBSTETRICS & GYNECOLOGY

## 2022-10-24 PROCEDURE — 90471 IMMUNIZATION ADMIN: CPT | Performed by: OBSTETRICS & GYNECOLOGY

## 2022-10-24 PROCEDURE — 36415 COLL VENOUS BLD VENIPUNCTURE: CPT | Performed by: OBSTETRICS & GYNECOLOGY

## 2022-10-24 PROCEDURE — 80053 COMPREHEN METABOLIC PANEL: CPT | Performed by: OBSTETRICS & GYNECOLOGY

## 2022-10-24 PROCEDURE — 90686 IIV4 VACC NO PRSV 0.5 ML IM: CPT | Performed by: OBSTETRICS & GYNECOLOGY

## 2022-10-24 PROCEDURE — 99207 PR PRENATAL VISIT: CPT | Performed by: OBSTETRICS & GYNECOLOGY

## 2022-10-24 NOTE — PROGRESS NOTES
Presents for routine  appointment.    Feels well today, but did not feel well this weekend.  BPs at home have been 140s over 90s.  She has no headache, vision changes, or indigestion.  No abnormal discharge, no leaking fluid, no contractions, no vaginal bleeding   ROS:   and GI  negative.     Please see Prenatal Vitals and Notes Flowsheet for objective data.    A/P:  37 year old  at 33w2d       ICD-10-CM    1. 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 Gestational hypertension, third trimester  O13.3 CBC with platelets     Comprehensive metabolic panel (BMP + Alb, Alk Phos, ALT, AST, Total. Bili, TP)     Protein  random urine      3. History of 2  sections  Z98.891       4. Encounter for sterilization  Z30.2       5. History of pre-eclampsia  Z87.59           Tdap given at previous visit.  Flu vaccine today.   GHTN and History of preE - low dose aspirin for preeclampsia risk reduction.  Weekly labs.  BPs checks at home and in the clinic 1-2 times per week  1st preg was c/b oligo and SGA.  - BPPs  weekly. Growths every 4 weeks at [facility].    History of  x2, plan repeat with tubal.  Waiting to get approval to schedule.  Anti-E - continue monthly titers.  Next due on .   Follow up in 2 weeks.      Jacquelyn Joya MD

## 2022-10-24 NOTE — TELEPHONE ENCOUNTER
Please see if patient can see me sometime between 1030 and 1200 at our [facility] Tuesday 10/25.  We will plan labs, coordinate future visits, and order her weekly BPPs tomorrow when I see her.

## 2022-10-24 NOTE — TELEPHONE ENCOUNTER
Called [facility] to reschedule  and tubal, which is currently scheduled for when the patient is 39+ wks.  She has GHTN and needs to be scheduled at 37 weeks, which is .  There are 5 cesareans already scheduled on the following Monday, Tuesday, and Wednesday.      Jacquelyn at [facility] will send an email to the appropriate people at [facility] to get approval for a weekend  or a 6th  for the .  She will contact Jaziel when she finds out when we can schedule this patient.

## 2022-10-24 NOTE — TELEPHONE ENCOUNTER
, 33w2d.    Pt's last prenatal appt was on 10/20/22.  Next prenatal appt is on .    Pt states MFM agreed she should deliver at 37 weeks.  Pt is wondering if she has a scheduled  on .  I do see a  scheduled in pt's chart on .    Also pt states MFM wants her to do a growth US with them on , but weekly USs at [facility] and to check labs (blood, urine) and blood pressure each week.  I am not able to see these MFM notes stating this information.    Routing to Dr. Joya to see if pt's  should be rescheduled and if she would be willing to place standing lab orders for pt to have weekly labs done.    Julianna Langford RN

## 2022-10-26 NOTE — TELEPHONE ENCOUNTER
Great - please double check with the  at [facility] to make sure it was changed to 11/22.  I don't see it on the [facility] schedule yet.

## 2022-10-26 NOTE — TELEPHONE ENCOUNTER
[facility] emailed back that they were able to get approval for patient to be scheduled for 11/22 at 4:30pm.  Notified patient and updated Dr Joya's surgery calendar.

## 2022-10-27 NOTE — TELEPHONE ENCOUNTER
[facility] apparently gave away the slot for  4:30pm so Dr Joya stated to schedule patient for  @ 4:30pm with Dr Mondragon to complete the  and Tubal as he is on call that day. Contacted patient and updated her with this information.

## 2022-10-27 NOTE — PATIENT INSTRUCTIONS
If you have labs or imaging done, the results will automatically release in [employer] without an interpretation.  Your health care professional will review those results and send an interpretation with recommendations as soon as possible, but this may be 1-3 business days.    If you have any questions regarding your visit, please contact your care team.     [employer]: [phone]  [facility] CLINIC HOURS TELEPHONE NUMBER       Jacquelyn Joya MD  Assistant Medical Director    Megan - Certified Medical Assistant     Roxy Levine-HERNÁN Kirk-  Mirella-     Monday- [place]  8:00 a.m - 5:00 p.m    Tuesday- Surgery        Thursday- [place]  8:00 a.m - 5:00 p.m.    Friday- [place]  7:30 a.m - 4:00 p.m. [facility]  [address] [place] [address]  [phone] Fax  Imaging Scheduling [phone]    [facility] Labor and Delivery  [address] [place] [address] Dr.  [place], [place] [address] [phone]    [address] [address] [phone] Fax  Imaging Scheduling [phone]     Urgent Care locations:  [facility] Monday-Friday  10 am - 8 pm  Saturday and Sunday   9 am - 5 pm  Monday-Friday   10 am - 8 pm  Saturday and Sunday   9 am - 5 pm   [phone]     **Surgeries** Our Surgery Schedulers will contact you to schedule. If you do not receive a call within 3 business days, please call [phone].    If you need a medication refill, please contact your pharmacy. Please allow 3 business days for your refill to be completed.    As always, thank you for trusting us with your healthcare needs!

## 2022-10-28 ENCOUNTER — ALLIED HEALTH/NURSE VISIT (OUTPATIENT)
Dept: NURSING | Facility: CLINIC | Age: 37
End: 2022-10-28
Payer: COMMERCIAL

## 2022-10-28 ENCOUNTER — ANCILLARY PROCEDURE (OUTPATIENT)
Dept: ULTRASOUND IMAGING | Facility: CLINIC | Age: 37
End: 2022-10-28
Attending: OBSTETRICS & GYNECOLOGY
Payer: COMMERCIAL

## 2022-10-28 VITALS — HEART RATE: 93 BPM | OXYGEN SATURATION: 95 % | DIASTOLIC BLOOD PRESSURE: 83 MMHG | SYSTOLIC BLOOD PRESSURE: 145 MMHG

## 2022-10-28 DIAGNOSIS — O13.2 GESTATIONAL HYPERTENSION, SECOND TRIMESTER: ICD-10-CM

## 2022-10-28 DIAGNOSIS — O13.3 GESTATIONAL HYPERTENSION, THIRD TRIMESTER: Primary | ICD-10-CM

## 2022-10-28 PROCEDURE — 76819 FETAL BIOPHYS PROFIL W/O NST: CPT | Performed by: RADIOLOGY

## 2022-10-28 PROCEDURE — 99207 PR NO CHARGE NURSE ONLY: CPT

## 2022-10-28 NOTE — PROGRESS NOTES
Patient states her BP goal needs to be under 160/100 w/o concerns.    Has weekly labs and BPP scheduled today.    Follow-up visit with Dr. Joya on Monday 10/31/22.    Tabitha Bass CMA

## 2022-10-30 ENCOUNTER — MYC MEDICAL ADVICE (OUTPATIENT)
Dept: OBGYN | Facility: CLINIC | Age: 37
End: 2022-10-30

## 2022-10-31 ENCOUNTER — PRENATAL OFFICE VISIT (OUTPATIENT)
Dept: OBGYN | Facility: CLINIC | Age: 37
End: 2022-10-31
Payer: COMMERCIAL

## 2022-10-31 VITALS
SYSTOLIC BLOOD PRESSURE: 151 MMHG | BODY MASS INDEX: 27.03 KG/M2 | WEIGHT: 157.5 LBS | HEART RATE: 78 BPM | DIASTOLIC BLOOD PRESSURE: 93 MMHG

## 2022-10-31 DIAGNOSIS — O13.3 GESTATIONAL HYPERTENSION, THIRD TRIMESTER: Primary | ICD-10-CM

## 2022-10-31 LAB
ALBUMIN MFR UR ELPH: <6 MG/DL
ALBUMIN SERPL-MCNC: 2.7 G/DL (ref 3.4–5)
ALP SERPL-CCNC: 286 U/L (ref 40–150)
ALT SERPL W P-5'-P-CCNC: 16 U/L (ref 0–50)
ANION GAP SERPL CALCULATED.3IONS-SCNC: 5 MMOL/L (ref 3–14)
AST SERPL W P-5'-P-CCNC: 16 U/L (ref 0–45)
BILIRUB SERPL-MCNC: 0.3 MG/DL (ref 0.2–1.3)
BUN SERPL-MCNC: 10 MG/DL (ref 7–30)
CALCIUM SERPL-MCNC: 8.8 MG/DL (ref 8.5–10.1)
CHLORIDE BLD-SCNC: 106 MMOL/L (ref 94–109)
CO2 SERPL-SCNC: 25 MMOL/L (ref 20–32)
CREAT SERPL-MCNC: 0.82 MG/DL (ref 0.52–1.04)
CREAT UR-MCNC: 16.6 MG/DL
ERYTHROCYTE [DISTWIDTH] IN BLOOD BY AUTOMATED COUNT: 12.7 % (ref 10–15)
GFR SERPL CREATININE-BSD FRML MDRD: >90 ML/MIN/1.73M2
GLUCOSE BLD-MCNC: 70 MG/DL (ref 70–99)
HCT VFR BLD AUTO: 37.3 % (ref 35–47)
HGB BLD-MCNC: 12.5 G/DL (ref 11.7–15.7)
MCH RBC QN AUTO: 30.2 PG (ref 26.5–33)
MCHC RBC AUTO-ENTMCNC: 33.5 G/DL (ref 31.5–36.5)
MCV RBC AUTO: 90 FL (ref 78–100)
PLATELET # BLD AUTO: 277 10E3/UL (ref 150–450)
POTASSIUM BLD-SCNC: 3.8 MMOL/L (ref 3.4–5.3)
PROT SERPL-MCNC: 7 G/DL (ref 6.8–8.8)
PROT/CREAT 24H UR: NORMAL MG/G{CREAT}
RBC # BLD AUTO: 4.14 10E6/UL (ref 3.8–5.2)
SODIUM SERPL-SCNC: 136 MMOL/L (ref 133–144)
WBC # BLD AUTO: 9.7 10E3/UL (ref 4–11)

## 2022-10-31 PROCEDURE — 80053 COMPREHEN METABOLIC PANEL: CPT | Performed by: OBSTETRICS & GYNECOLOGY

## 2022-10-31 PROCEDURE — 84156 ASSAY OF PROTEIN URINE: CPT | Performed by: OBSTETRICS & GYNECOLOGY

## 2022-10-31 PROCEDURE — 36415 COLL VENOUS BLD VENIPUNCTURE: CPT | Performed by: OBSTETRICS & GYNECOLOGY

## 2022-10-31 PROCEDURE — 99207 PR PRENATAL VISIT: CPT | Performed by: OBSTETRICS & GYNECOLOGY

## 2022-10-31 PROCEDURE — 85027 COMPLETE CBC AUTOMATED: CPT | Performed by: OBSTETRICS & GYNECOLOGY

## 2022-10-31 NOTE — PROGRESS NOTES
"  Presents for routine  appointment.     No complaints.  \"Finally feeling good for once\"  No abnormal discharge, no leaking fluid , no contractions , no vaginal bleeding    ROS:   and GI  negative.     Please see Prenatal Vitals and Notes Flowsheet for objective data.    A/P:  37 year old  at 34w2d       ICD-10-CM    1. Gestational hypertension, third trimester  O13.3 CBC with platelets     Comprehensive metabolic panel (BMP + Alb, Alk Phos, ALT, AST, Total. Bili, TP)     Protein  random urine          Reportable signs and symptoms discussed  Group B Strep next visit  GHTN and History of preE - low dose aspirin for preeclampsia risk reduction.  Weekly labs.  BPs checks at home and in the clinic 1-2 times per week  1st preg was c/b oligo and SGA.  - BPPs  weekly. Growths every 4 weeks at MFM.    History of  x2, plan repeat with tubal scheduled at 37w 2d on Monday the .  Plan preop next visit  Anti-E - continue monthly titers.  Next due on .   Follow up in 1 week.      Jacquelyn Joya MD        "

## 2022-11-04 ENCOUNTER — PRENATAL OFFICE VISIT (OUTPATIENT)
Dept: OBGYN | Facility: CLINIC | Age: 37
End: 2022-11-04
Payer: COMMERCIAL

## 2022-11-04 ENCOUNTER — ANCILLARY PROCEDURE (OUTPATIENT)
Dept: ULTRASOUND IMAGING | Facility: CLINIC | Age: 37
End: 2022-11-04
Attending: OBSTETRICS & GYNECOLOGY
Payer: COMMERCIAL

## 2022-11-04 VITALS
HEART RATE: 68 BPM | WEIGHT: 157.9 LBS | SYSTOLIC BLOOD PRESSURE: 151 MMHG | DIASTOLIC BLOOD PRESSURE: 97 MMHG | BODY MASS INDEX: 27.1 KG/M2

## 2022-11-04 DIAGNOSIS — O13.2 GESTATIONAL HYPERTENSION, SECOND TRIMESTER: ICD-10-CM

## 2022-11-04 DIAGNOSIS — O13.3 GESTATIONAL HYPERTENSION, THIRD TRIMESTER: Primary | ICD-10-CM

## 2022-11-04 PROCEDURE — 76819 FETAL BIOPHYS PROFIL W/O NST: CPT | Performed by: RADIOLOGY

## 2022-11-04 PROCEDURE — 99207 PR PRENATAL VISIT: CPT | Performed by: OBSTETRICS & GYNECOLOGY

## 2022-11-04 NOTE — PROGRESS NOTES
Presents for routine  appointment.    Feeling stable. No HA, no vision, no nausea.  No abdominal lab.  No labor concerns.    Please see Prenatal Vitals and Notes Flowsheet for objective data.    A/P:  37 year old  at 34w6d       ICD-10-CM    1. Gestational hypertension, third trimester  O13.3           Reportable signs and symptoms discussed  Follow up in 1 week      Jacquelyn Joya MD

## 2022-11-04 NOTE — PATIENT INSTRUCTIONS
If you have labs or imaging done, the results will automatically release in [employer] without an interpretation.  Your health care professional will review those results and send an interpretation with recommendations as soon as possible, but this may be 1-3 business days.    If you have any questions regarding your visit, please contact your care team.     [employer]: [phone]  [facility] CLINIC HOURS TELEPHONE NUMBER       Jacquelyn Joya MD  Assistant Medical Director    Megan - Certified Medical Assistant     Roxy Levine-HERNÁN Kirk-  Mirella-     Monday- [place]  8:00 a.m - 5:00 p.m    Tuesday- Surgery        Thursday- [place]  8:00 a.m - 5:00 p.m.    Friday- [place]  7:30 a.m - 4:00 p.m. [facility]  [address] [place] [address]  [phone] Fax  Imaging Scheduling [phone]    [facility] Labor and Delivery  [address] [place] [address] Dr.  [place], [place] [address] [phone]    [address] [id number] [phone] Fax  Imaging Scheduling [phone]     Urgent Care locations:  [facility] Monday-Friday  10 am - 8 pm  Saturday and Sunday   9 am - 5 pm  Monday-Friday   10 am - 8 pm  Saturday and Sunday   9 am - 5 pm   [phone]     **Surgeries** Our Surgery Schedulers will contact you to schedule. If you do not receive a call within 3 business days, please call [phone].    If you need a medication refill, please contact your pharmacy. Please allow 3 business days for your refill to be completed.    As always, thank you for trusting us with your healthcare needs!

## 2022-11-07 ENCOUNTER — PRENATAL OFFICE VISIT (OUTPATIENT)
Dept: OBGYN | Facility: CLINIC | Age: 37
End: 2022-11-07
Payer: COMMERCIAL

## 2022-11-07 VITALS
SYSTOLIC BLOOD PRESSURE: 156 MMHG | DIASTOLIC BLOOD PRESSURE: 96 MMHG | WEIGHT: 155.4 LBS | BODY MASS INDEX: 26.67 KG/M2 | HEART RATE: 70 BPM

## 2022-11-07 DIAGNOSIS — Z30.2 ENCOUNTER FOR STERILIZATION: ICD-10-CM

## 2022-11-07 DIAGNOSIS — O36.0910 ANTI-E ISOIMMUNIZATION AFFECTING PREGNANCY IN FIRST TRIMESTER, SINGLE OR UNSPECIFIED FETUS: ICD-10-CM

## 2022-11-07 DIAGNOSIS — O13.3 GESTATIONAL HYPERTENSION, THIRD TRIMESTER: Primary | ICD-10-CM

## 2022-11-07 DIAGNOSIS — Z98.891 HISTORY OF 2 CESAREAN SECTIONS: ICD-10-CM

## 2022-11-07 DIAGNOSIS — Z87.59 HISTORY OF PRE-ECLAMPSIA: ICD-10-CM

## 2022-11-07 LAB
ALBUMIN MFR UR ELPH: 60 MG/DL
ALBUMIN SERPL-MCNC: 2.9 G/DL (ref 3.4–5)
ALP SERPL-CCNC: 361 U/L (ref 40–150)
ALT SERPL W P-5'-P-CCNC: 19 U/L (ref 0–50)
ANION GAP SERPL CALCULATED.3IONS-SCNC: 5 MMOL/L (ref 3–14)
AST SERPL W P-5'-P-CCNC: 17 U/L (ref 0–45)
BILIRUB SERPL-MCNC: 0.3 MG/DL (ref 0.2–1.3)
BUN SERPL-MCNC: 12 MG/DL (ref 7–30)
CALCIUM SERPL-MCNC: 8.8 MG/DL (ref 8.5–10.1)
CHLORIDE BLD-SCNC: 108 MMOL/L (ref 94–109)
CO2 SERPL-SCNC: 25 MMOL/L (ref 20–32)
CREAT SERPL-MCNC: 0.7 MG/DL (ref 0.52–1.04)
CREAT UR-MCNC: 236 MG/DL
ERYTHROCYTE [DISTWIDTH] IN BLOOD BY AUTOMATED COUNT: 12.6 % (ref 10–15)
GFR SERPL CREATININE-BSD FRML MDRD: >90 ML/MIN/1.73M2
GLUCOSE BLD-MCNC: 91 MG/DL (ref 70–99)
HCT VFR BLD AUTO: 40.1 % (ref 35–47)
HGB BLD-MCNC: 13.6 G/DL (ref 11.7–15.7)
MCH RBC QN AUTO: 30 PG (ref 26.5–33)
MCHC RBC AUTO-ENTMCNC: 33.9 G/DL (ref 31.5–36.5)
MCV RBC AUTO: 89 FL (ref 78–100)
PLATELET # BLD AUTO: 295 10E3/UL (ref 150–450)
POTASSIUM BLD-SCNC: 3.7 MMOL/L (ref 3.4–5.3)
PROT SERPL-MCNC: 7.5 G/DL (ref 6.8–8.8)
PROT/CREAT 24H UR: 0.25 MG/MG CR (ref 0–0.2)
RBC # BLD AUTO: 4.53 10E6/UL (ref 3.8–5.2)
SODIUM SERPL-SCNC: 138 MMOL/L (ref 133–144)
WBC # BLD AUTO: 9.6 10E3/UL (ref 4–11)

## 2022-11-07 PROCEDURE — 85027 COMPLETE CBC AUTOMATED: CPT | Performed by: OBSTETRICS & GYNECOLOGY

## 2022-11-07 PROCEDURE — 36415 COLL VENOUS BLD VENIPUNCTURE: CPT | Performed by: OBSTETRICS & GYNECOLOGY

## 2022-11-07 PROCEDURE — 80053 COMPREHEN METABOLIC PANEL: CPT | Performed by: OBSTETRICS & GYNECOLOGY

## 2022-11-07 PROCEDURE — 84156 ASSAY OF PROTEIN URINE: CPT | Performed by: OBSTETRICS & GYNECOLOGY

## 2022-11-07 PROCEDURE — 99207 PR PRENATAL VISIT: CPT | Performed by: OBSTETRICS & GYNECOLOGY

## 2022-11-16 ENCOUNTER — MYC MEDICAL ADVICE (OUTPATIENT)
Dept: OBGYN | Facility: CLINIC | Age: 37
End: 2022-11-16

## 2022-11-16 DIAGNOSIS — R51.9 NONINTRACTABLE HEADACHE, UNSPECIFIED CHRONICITY PATTERN, UNSPECIFIED HEADACHE TYPE: Primary | ICD-10-CM

## 2022-11-17 RX ORDER — METOCLOPRAMIDE 10 MG/1
10 TABLET ORAL
Qty: 20 TABLET | Refills: 0 | Status: SHIPPED | OUTPATIENT
Start: 2022-11-17

## 2022-11-17 NOTE — TELEPHONE ENCOUNTER
Pt had a  on 22.    Pt is writing in stating she had high BP's yesterday (156/97 and 147/100).  Pt was not d/c'd from the hospital on BP meds.  She has had a constant HA.  Increase in swelling in her feet and face.    BP today is 138/88.  HA at a constant 2, may increase to a 3 out of 10.  Tylenol and Ibuprofen do not seem to help.  Pt is well hydrated and eating well.    Denies vision changes, shortness of breath or difficulty breathing, upper abdominal pain, fever, N/V.    Pt is wondering if she should be put on a low dose blood pressure medication as she has been having some higher blood pressures and is wondering if this would help her constant low grade headache.    Routing to Dr. Joya to advise.  Since pt's BP is <140/90 today and has not other symptoms except a mild HA I think it is fine to monitor at this point until we hear back from Dr. Joya.    Julianna Langford, RN

## 2022-11-18 ENCOUNTER — MYC MEDICAL ADVICE (OUTPATIENT)
Dept: OBGYN | Facility: CLINIC | Age: 37
End: 2022-11-18

## 2022-11-18 NOTE — TELEPHONE ENCOUNTER
Form filled out waiting for providers signature and for pt reply.  Megan Akins CMA 11/18/2022 9:28 AM

## 2022-11-21 ENCOUNTER — ALLIED HEALTH/NURSE VISIT (OUTPATIENT)
Dept: FAMILY MEDICINE | Facility: OTHER | Age: 37
End: 2022-11-21
Payer: COMMERCIAL

## 2022-11-21 VITALS — DIASTOLIC BLOOD PRESSURE: 78 MMHG | SYSTOLIC BLOOD PRESSURE: 120 MMHG

## 2022-11-21 DIAGNOSIS — Z01.30 BP CHECK: Primary | ICD-10-CM

## 2022-11-21 PROCEDURE — 99207 PR NO CHARGE NURSE ONLY: CPT

## 2022-11-21 NOTE — PROGRESS NOTES
Jeannette Sykes is a 37 year old patient who comes in today for a Blood Pressure check.  Initial BP:  /78 (Cuff Size: Adult Regular)   LMP 03/09/2022 (Approximate)      Data Unavailable  Disposition: results routed to provider

## 2022-11-21 NOTE — Clinical Note
Patient was in today for her child's well visit and requested a blood pressure check. Please advise patient of next steps.

## 2023-01-16 ENCOUNTER — MEDICAL CORRESPONDENCE (OUTPATIENT)
Dept: HEALTH INFORMATION MANAGEMENT | Facility: CLINIC | Age: 38
End: 2023-01-16

## 2023-03-13 ENCOUNTER — MEDICAL CORRESPONDENCE (OUTPATIENT)
Dept: HEALTH INFORMATION MANAGEMENT | Facility: CLINIC | Age: 38
End: 2023-03-13

## 2023-03-28 ENCOUNTER — MYC MEDICAL ADVICE (OUTPATIENT)
Dept: OBGYN | Facility: CLINIC | Age: 38
End: 2023-03-28
Payer: COMMERCIAL

## 2023-03-29 NOTE — TELEPHONE ENCOUNTER
"Pt had a  on 22.  She was not seen for a post partum visit.    She is writing in today, 4 months post partum, asking if she needs to be seen.  She has no concerns with her  incision.  However, she is inquiring about her \"blood issues\" that was discovered a day prior to her .    Per 22 prenatal OV note: \"Anti-E - continue monthly titers.  repeat next week\"    Pt would like to discuss \"those blood issues and what it means going forward for her and baby\".  Routing to Dr. Joya to clarify if pt should be scheduling with OB/GYN for further discussion or if she should be following up with FP.    Julianna Langford RN       "

## 2023-03-30 NOTE — TELEPHONE ENCOUNTER
Anti-E was noted throughout her pregnancy and Anti-Fya (Harry) was the new one that was seen the day of her  section.  Titers were both zero.    There is no problem to her current child.  Future pregnancies would need to be followed closely because it could affect a future child in utero, however this is not a concern given the fact she has had a tubal ligation.  It is only important for her in the event she needs a blood transfusion so they make sure any blood she receives does not have an E antigen or an Fya antigen.    She can make an appointment with her primary care provider or with me if she would like to discuss this in more detail.   no

## 2023-04-30 ENCOUNTER — HEALTH MAINTENANCE LETTER (OUTPATIENT)
Age: 38
End: 2023-04-30

## 2023-05-30 ENCOUNTER — MEDICAL CORRESPONDENCE (OUTPATIENT)
Dept: HEALTH INFORMATION MANAGEMENT | Facility: CLINIC | Age: 38
End: 2023-05-30
Payer: COMMERCIAL

## 2023-09-26 ENCOUNTER — ALLIED HEALTH/NURSE VISIT (OUTPATIENT)
Dept: FAMILY MEDICINE | Facility: OTHER | Age: 38
End: 2023-09-26
Payer: COMMERCIAL

## 2023-09-26 DIAGNOSIS — Z23 IMMUNIZATION DUE: Primary | ICD-10-CM

## 2023-09-26 PROCEDURE — 90471 IMMUNIZATION ADMIN: CPT

## 2023-09-26 PROCEDURE — 99207 PR NO CHARGE NURSE ONLY: CPT

## 2023-09-26 PROCEDURE — 90686 IIV4 VACC NO PRSV 0.5 ML IM: CPT

## 2023-09-26 NOTE — PROGRESS NOTES
Prior to immunization administration, verified patients identity using patient s name and date of birth. Please see Immunization Activity for additional information.     Screening Questionnaire for Adult Immunization    Are you sick today?   No   Do you have allergies to medications, food, a vaccine component or latex?   No   Have you ever had a serious reaction after receiving a vaccination?   No   Do you have a long-term health problem with heart, lung, kidney, or metabolic disease (e.g., diabetes), asthma, a blood disorder, no spleen, complement component deficiency, a cochlear implant, or a spinal fluid leak?  Are you on long-term aspirin therapy?   No   Do you have cancer, leukemia, HIV/AIDS, or any other immune system problem?   No   Do you have a parent, brother, or sister with an immune system problem?   No   In the past 3 months, have you taken medications that affect  your immune system, such as prednisone, other steroids, or anticancer drugs; drugs for the treatment of rheumatoid arthritis, Crohn s disease, or psoriasis; or have you had radiation treatments?   No   Have you had a seizure, or a brain or other nervous system problem?   No   During the past year, have you received a transfusion of blood or blood    products, or been given immune (gamma) globulin or antiviral drug?   No   For women: Are you pregnant or is there a chance you could become       pregnant during the next month?   No   Have you received any vaccinations in the past 4 weeks?   No     Immunization questionnaire answers were all negative.    I have reviewed the following standing orders:   This patient is due and qualifies for the Influenza vaccine.    Click here for Influenza Vaccine Standing Order    I have reviewed the vaccines inclusion and exclusion criteria; No concerns regarding eligibility.     Patient instructed to remain in clinic for 15 minutes afterwards, and to report any adverse reactions.     Screening performed by  Kiya Montana MA on 9/26/2023 at 4:02 PM.

## 2024-07-07 ENCOUNTER — HEALTH MAINTENANCE LETTER (OUTPATIENT)
Age: 39
End: 2024-07-07

## 2024-12-20 ENCOUNTER — OFFICE VISIT (OUTPATIENT)
Dept: OBGYN | Facility: OTHER | Age: 39
End: 2024-12-20
Payer: COMMERCIAL

## 2024-12-20 VITALS — DIASTOLIC BLOOD PRESSURE: 107 MMHG | SYSTOLIC BLOOD PRESSURE: 157 MMHG | BODY MASS INDEX: 25.3 KG/M2 | WEIGHT: 147.4 LBS

## 2024-12-20 DIAGNOSIS — N92.1 MENOMETRORRHAGIA: Primary | ICD-10-CM

## 2024-12-20 DIAGNOSIS — Z98.891 HISTORY OF 3 CESAREAN SECTIONS: ICD-10-CM

## 2024-12-20 DIAGNOSIS — Z98.51 HISTORY OF TUBAL LIGATION: ICD-10-CM

## 2024-12-20 PROCEDURE — 99214 OFFICE O/P EST MOD 30 MIN: CPT | Performed by: OBSTETRICS & GYNECOLOGY

## 2024-12-20 RX ORDER — NORETHINDRONE 5 MG/1
5 TABLET ORAL DAILY
Qty: 90 TABLET | Refills: 0 | Status: SHIPPED | OUTPATIENT
Start: 2024-12-20 | End: 2024-12-20

## 2024-12-20 RX ORDER — NORETHINDRONE 5 MG/1
5 TABLET ORAL DAILY
Qty: 90 TABLET | Refills: 0 | Status: SHIPPED | OUTPATIENT
Start: 2024-12-20

## 2024-12-20 NOTE — PROGRESS NOTES
Subjective  39 year old non-pregnant female presents today complaining of menorrhagia.  She use to bleed every 28 days and that doesn't happen anymore.  Patient states she is passing huge clots.  She will take a shower and actually go into her vagina and scoop out blood.  She will pass so much blood she is changing her clothes regularly.  When she leaves the house she has to bring new clothes.  She has had a D&C in the past-2019.  She was told her lining was 19mm then and she had just had a menses. Patient bleeds for 7 days.  Her last menstrual period 2024.  No dizziness, but shaky when it really heavy on 2 days.  Lightheaded as well.  3 c sections with a tubal ligation.  Patient had GHTN with her last.  She never followed up.  No tobacco abuse.  Family history of strokes in mother.  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  I recommended a pelvic ultrasound and endometrial biopsy and patient will schedule those.        ROS: 10 point ROS neg other than the symptoms noted above in the HPI.  Past Medical History:   Diagnosis Date    Female infertility     Menorrhagia with regular cycle      Past Surgical History:   Procedure Laterality Date     SECTION      x 2    ROTATOR CUFF REPAIR RT/LT      SEPTOPLASTY       Family History   Problem Relation Age of Onset    Alzheimer Disease Mother     Hypertension Father     Substance Abuse Maternal Grandmother     Anxiety Disorder Maternal Grandmother     Cerebrovascular Disease Maternal Grandmother     Seizure Disorder Maternal Grandfather      Social History     Tobacco Use    Smoking status: Former    Smokeless tobacco: Never   Substance Use Topics    Alcohol use: No         Objective  Vitals: BP (!) 157/107 (BP Location: Left arm, Cuff Size: Adult Regular)   Wt 66.9 kg (147 lb 6.4 oz)   LMP 2024   Breastfeeding No   BMI 25.30 kg/m    BMI= Body mass index is 25.3 kg/m .    General appearance=well developed, well-nourished  female  Gait=normal      Assessment  1.)  Menometrorrhagia  2.)  History of 3 c sections  3.)  History of tubal ligation  4.)  History of GHTN  5.)  HTN  6.)  Red blood cell antibody      Plan  1.)  Aygestin ordered  2.)  Schedule pelvic ultrasound  3.)  Follow-up for endometrial biopsy and exam  4.)  Follow-up with FP for HTN      Chronic, uncomplicated illness or injury and prescription ordered.   We discussed possible causes for her menometrorrhagia.  I recommended a pelvic ultrasound to rule out any pathology.  We discussed treatment options including hormone therapy.  Patient is not wanting an IUD.  She is wanting to start with an oral contractive pills.  If she does not get relief that, she may consider a hysterectomy.  I recommended an endometrial biopsy and she will schedule that.  Nursing notes read and reviewed    Ying Carvajal DO

## 2024-12-21 PROBLEM — Z30.2 ENCOUNTER FOR STERILIZATION: Status: RESOLVED | Noted: 2022-09-22 | Resolved: 2024-12-21

## 2024-12-21 PROBLEM — O09.521 MULTIGRAVIDA OF ADVANCED MATERNAL AGE IN FIRST TRIMESTER: Status: RESOLVED | Noted: 2022-05-02 | Resolved: 2024-12-21

## 2024-12-21 PROBLEM — O13.3 GESTATIONAL HYPERTENSION, THIRD TRIMESTER: Status: RESOLVED | Noted: 2022-10-24 | Resolved: 2024-12-21

## 2024-12-21 PROBLEM — Z98.891 HISTORY OF 2 CESAREAN SECTIONS: Status: RESOLVED | Noted: 2022-05-02 | Resolved: 2024-12-21

## 2024-12-21 NOTE — PATIENT INSTRUCTIONS
Please call if you any questions.    [address]   [address]  [phone]        Ying Carvajal,

## 2025-01-13 ENCOUNTER — ANCILLARY PROCEDURE (OUTPATIENT)
Dept: ULTRASOUND IMAGING | Facility: OTHER | Age: 40
End: 2025-01-13
Attending: OBSTETRICS & GYNECOLOGY
Payer: COMMERCIAL

## 2025-01-13 DIAGNOSIS — N92.1 MENOMETRORRHAGIA: ICD-10-CM

## 2025-01-13 PROCEDURE — 76830 TRANSVAGINAL US NON-OB: CPT | Mod: TC | Performed by: RADIOLOGY

## 2025-01-13 PROCEDURE — 76856 US EXAM PELVIC COMPLETE: CPT | Mod: TC | Performed by: RADIOLOGY

## 2025-01-14 ENCOUNTER — ALLIED HEALTH/NURSE VISIT (OUTPATIENT)
Dept: FAMILY MEDICINE | Facility: OTHER | Age: 40
End: 2025-01-14
Payer: COMMERCIAL

## 2025-01-14 VITALS — DIASTOLIC BLOOD PRESSURE: 70 MMHG | SYSTOLIC BLOOD PRESSURE: 130 MMHG

## 2025-01-14 DIAGNOSIS — Z23 ENCOUNTER FOR IMMUNIZATION: Primary | ICD-10-CM

## 2025-01-14 PROCEDURE — 99207 PR NO CHARGE NURSE ONLY: CPT

## 2025-01-14 PROCEDURE — 90471 IMMUNIZATION ADMIN: CPT

## 2025-01-14 PROCEDURE — 90656 IIV3 VACC NO PRSV 0.5 ML IM: CPT

## 2025-01-14 NOTE — PROGRESS NOTES
Jeannette Sykes is a 39 year old patient who comes in today for a Blood Pressure check with a Medical Assistant because of Patient had high blood pressure at her appointment with OBGYN Dr. Carvajal recommended her to follow up with FP For HTN .  Initial BP:  /70   LMP 12/06/2024      Blood pressure is not high.   Repeat Blood pressure: No  Patient is not taking medication for HTN   Is patient taking blood pressures at home? No  Current Symptoms: none    Disposition:   Abnormal Blood Pressure NO  No: CC this chart to requesting provider/PCP.        Prior to immunization administration, verified patients identity using patient s name and date of birth. Please see Immunization Activity for additional information.     Is the patient's temperature normal (100.5 or less)? Yes     Patient MEETS CRITERIA. PROCEED with vaccine administration.      Patient instructed to remain in clinic for 15 minutes afterwards, and to report any adverse reactions.      Link to Ancillary Visit Immunization Standing Orders SmartSet     Screening performed by Terrie Nava CMA on 1/14/2025 at 4:17 PM.

## 2025-02-17 ENCOUNTER — OFFICE VISIT (OUTPATIENT)
Dept: FAMILY MEDICINE | Facility: OTHER | Age: 40
End: 2025-02-17
Payer: COMMERCIAL

## 2025-02-17 VITALS
SYSTOLIC BLOOD PRESSURE: 136 MMHG | RESPIRATION RATE: 20 BRPM | BODY MASS INDEX: 25.18 KG/M2 | HEIGHT: 64 IN | DIASTOLIC BLOOD PRESSURE: 78 MMHG | HEART RATE: 93 BPM | OXYGEN SATURATION: 99 % | WEIGHT: 147.5 LBS | TEMPERATURE: 99 F

## 2025-02-17 DIAGNOSIS — D22.9 NUMEROUS MOLES: Primary | ICD-10-CM

## 2025-02-17 DIAGNOSIS — R76.8 RED BLOOD CELL ANTIBODY POSITIVE: ICD-10-CM

## 2025-02-17 DIAGNOSIS — I10 HYPERTENSION GOAL BP (BLOOD PRESSURE) < 130/80: ICD-10-CM

## 2025-02-17 DIAGNOSIS — J01.90 ACUTE SINUSITIS TREATED WITH ANTIBIOTICS IN THE PAST 60 DAYS: ICD-10-CM

## 2025-02-17 PROCEDURE — G2211 COMPLEX E/M VISIT ADD ON: HCPCS | Performed by: PHYSICIAN ASSISTANT

## 2025-02-17 PROCEDURE — 99204 OFFICE O/P NEW MOD 45 MIN: CPT | Performed by: PHYSICIAN ASSISTANT

## 2025-02-17 RX ORDER — DOXYCYCLINE 100 MG/1
100 CAPSULE ORAL 2 TIMES DAILY
Qty: 20 CAPSULE | Refills: 0 | Status: SHIPPED | OUTPATIENT
Start: 2025-02-17 | End: 2025-02-27

## 2025-02-17 ASSESSMENT — PAIN SCALES - GENERAL: PAINLEVEL_OUTOF10: MODERATE PAIN (5)

## 2025-02-17 NOTE — PATIENT INSTRUCTIONS
Labs today, await results   BP checks at home, keep a log, send via [employer] every 1-2 weeks   Goal <130/80 consistently  Start antibiotic - Doxycycline - twice a day for 10 days

## 2025-02-17 NOTE — PROGRESS NOTES
Assessment & Plan     ICD-10-CM    1. Numerous moles  D22.9 Adult Dermatology  Referral      2. Hypertension goal BP (blood pressure) < 130/80  I10 Comprehensive metabolic panel (BMP + Alb, Alk Phos, ALT, AST, Total. Bili, TP)     CBC with platelets     TSH with free T4 reflex      3. Acute sinusitis treated with antibiotics in the past 60 days  J01.90 doxycycline hyclate (VIBRAMYCIN) 100 MG capsule      4. Red blood cell antibody positive  R76.8         Referred to dermatology per patient request    2. HTN   - Diagnosed after pregnancy, reviewed last labs   - Doesn't appear had a full work up for reversible causes     Recommend we do this, await results   - BP borderline today, had some higher ones in the chart   - Recommend monitoring at home since borderline      Send log in 1-2 weeks via [employer]   - If labs normal and still high at home, then would start Losartan at 25 mg     3. Acute sinusitis   - Due to symptoms and duration, Augmentin failure, will treat with Doxycycline   - Discussed antibiotic use, duration, and side effects  - Encouraged rest and fluids     4. Reviewed chart, Anti-E, A positive blood         The patient indicates understanding of these issues and agrees with the plan.    Follow up: pending above     Pablo Jason-MATHEW Correia  [facility]         Gurpreet Machado is a 39 year old, presenting for the following health issues:  Establish Care      2/17/2025     4:48 PM   Additional Questions   Roomed by Fang   Accompanied by self         2/17/2025     4:48 PM   Patient Reported Additional Medications   Patient reports taking the following new medications NA     History of Present Illness       Reason for visit:  Sore throat,blood pressuee,referral for moles   She is taking medications regularly.       HTN   - Elevated since 3rd baby   - Can feel BP spikes   - Had full work up   - Doesn't check at home, but thinks gets to 150s   - Runs in the family      - Blood issue during pregnancy         Pre-Provider Visit Orders - Rapid Strep  Does the patient have shortness of breath/trouble breathing, an earache, drooling/too much saliva, or any difficulty opening her mouth/moving neck?  No  Does the patient have a sore throat and either history of fever >100.4 in the previous 24 hours without a cough or recent exposure to a known case of strep throat? No        Acute Illness  Acute illness concerns: Flu like symptoms  Onset/Duration: 1/15/25  Symptoms:  Fever: YES- last fever 103.8 1/16 and 1/17/25 last day of fever  Chills/Sweats: YES  Headache (location?): YES  Sinus Pressure: YES  Conjunctivitis:  YES  Ear Pain: YES: bilateral  Rhinorrhea: No  Congestion: YES  Sore Throat: YES  Cough: YES-productive of yellow sputum, productive of green sputum, waxing and waning over time  Wheeze: No  Decreased Appetite: No  Nausea: No  Vomiting: No  Diarrhea: No  Dysuria/Freq.: YES  Dysuria or Hematuria: No  Fatigue/Achiness: YES- not currently, in the past  Sick/Strep Exposure: No  Therapies tried and outcome: Amoxicillin-clauv, nyquil, dayquil, tylenol cold, advil/tylenol, afrin, saline: temporarily relieve some symptoms, but nothing completely cured     - Thinks had the flu as well           Skin Lesion  Onset/Duration: 8 years or more  Description  Location: head/back/face/shoulder/chest  Color: skin colored, black, and pink  Border description: irregular border, raised, flat, classic small skin tags    Itching: no  Bleeding:  No  Intensity:  NA  Progression of Symptoms:  worsening  Accompanying signs and symptoms:   Bleeding: No  Scaling: No  Excessive sun exposure/tanning: No  Sunscreen used: YES  History:           Any previous history of skin cancer: No  Any family history of melanoma: YES  Previous episodes of similar lesion: YES  Precipitating or alleviating factors: NA  Therapies tried and outcome: Removal            Review of Systems  Constitutional, neuro, ENT,  "endocrine, pulmonary, cardiac, gastrointestinal, genitourinary, musculoskeletal, integument and psychiatric systems are negative, except as otherwise noted.      Objective    /78   Pulse 93   Temp 99  F (37.2  C) (Temporal)   Resp 20   Ht 1.63 m (5' 4.17\")   Wt 66.9 kg (147 lb 8 oz)   LMP 02/17/2025 (Exact Date)   SpO2 99%   BMI 25.18 kg/m    Body mass index is 25.18 kg/m .  Physical Exam   GENERAL APPEARANCE: mildly ill appearing, alert and no distress  EYES: Eyes grossly normal to inspection, PERRLA, conjunctivae and sclerae without injection or discharge, EOM intact   HENT: Bilateral ear canals without erythema or cerumen, bilateral TM's pearly grey with normal light reflex, no effusion, injection, or bulging, nasal turbinates with severe swelling & erythema, yellow-green nasal discharge, mouth without ulcers or lesions, oropharynx clear and oral mucous membranes moist, drainage in the posterior pharynx   NECK: No adenopathy in cervical or supraclavicular regions  RESP: Lungs clear to auscultation - no rales, rhonchi or wheezes   CV: Regular rates and rhythm, normal S1 S2, no S3 or S4, no murmur, click or rub  MS: No musculoskeletal defects are noted and gait is age appropriate without ataxia   SKIN: No suspicious lesions or rashes, hydration status appears adeuqate with normal skin turgor   PSYCH: Alert and oriented x3; speech- coherent , normal rate and volume; able to articulate logical thoughts, able to abstract reason, no tangential thoughts, no hallucinations or delusions, mentation appears normal, Mood is euthymic. Affect is appropriate for this mood state and bright. Thought content is free of suicidal ideation, hallucinations, and delusions. Dress is adequate and upkept. Eye contact is good during conversation.         Diagnostics: Reviewed in Epic, see orders pending in Epic         Signed Electronically by: Lu Masterson PA-C    "

## 2025-02-24 ENCOUNTER — LAB (OUTPATIENT)
Dept: LAB | Facility: OTHER | Age: 40
End: 2025-02-24
Payer: COMMERCIAL

## 2025-02-24 DIAGNOSIS — I10 HYPERTENSION GOAL BP (BLOOD PRESSURE) < 130/80: ICD-10-CM

## 2025-02-24 LAB
ALBUMIN SERPL BCG-MCNC: 4.3 G/DL (ref 3.5–5.2)
ALP SERPL-CCNC: 64 U/L (ref 40–150)
ALT SERPL W P-5'-P-CCNC: 15 U/L (ref 0–50)
ANION GAP SERPL CALCULATED.3IONS-SCNC: 12 MMOL/L (ref 7–15)
AST SERPL W P-5'-P-CCNC: 15 U/L (ref 0–45)
BILIRUB SERPL-MCNC: 0.2 MG/DL
BUN SERPL-MCNC: 8.8 MG/DL (ref 6–20)
CALCIUM SERPL-MCNC: 8.8 MG/DL (ref 8.8–10.4)
CHLORIDE SERPL-SCNC: 106 MMOL/L (ref 98–107)
CREAT SERPL-MCNC: 0.62 MG/DL (ref 0.51–0.95)
EGFRCR SERPLBLD CKD-EPI 2021: >90 ML/MIN/1.73M2
ERYTHROCYTE [DISTWIDTH] IN BLOOD BY AUTOMATED COUNT: 13.2 % (ref 10–15)
GLUCOSE SERPL-MCNC: 95 MG/DL (ref 70–99)
HCO3 SERPL-SCNC: 22 MMOL/L (ref 22–29)
HCT VFR BLD AUTO: 37.6 % (ref 35–47)
HGB BLD-MCNC: 12.6 G/DL (ref 11.7–15.7)
MCH RBC QN AUTO: 31.3 PG (ref 26.5–33)
MCHC RBC AUTO-ENTMCNC: 33.5 G/DL (ref 31.5–36.5)
MCV RBC AUTO: 93 FL (ref 78–100)
PLATELET # BLD AUTO: 362 10E3/UL (ref 150–450)
POTASSIUM SERPL-SCNC: 4.1 MMOL/L (ref 3.4–5.3)
PROT SERPL-MCNC: 7.4 G/DL (ref 6.4–8.3)
RBC # BLD AUTO: 4.03 10E6/UL (ref 3.8–5.2)
SODIUM SERPL-SCNC: 140 MMOL/L (ref 135–145)
TSH SERPL DL<=0.005 MIU/L-ACNC: 2.14 UIU/ML (ref 0.3–4.2)
WBC # BLD AUTO: 6.9 10E3/UL (ref 4–11)

## 2025-02-24 PROCEDURE — 80053 COMPREHEN METABOLIC PANEL: CPT

## 2025-02-24 PROCEDURE — 84443 ASSAY THYROID STIM HORMONE: CPT

## 2025-02-24 PROCEDURE — 85027 COMPLETE CBC AUTOMATED: CPT

## 2025-02-24 PROCEDURE — 36415 COLL VENOUS BLD VENIPUNCTURE: CPT

## 2025-02-25 NOTE — RESULT ENCOUNTER NOTE
Joyce Machado    Your results were normal.     The results are attached for your review.       Pablo Masterson PA-C

## 2025-03-24 ENCOUNTER — OFFICE VISIT (OUTPATIENT)
Dept: OBGYN | Facility: OTHER | Age: 40
End: 2025-03-24
Payer: COMMERCIAL

## 2025-03-24 ENCOUNTER — TELEPHONE (OUTPATIENT)
Dept: OBGYN | Facility: CLINIC | Age: 40
End: 2025-03-24

## 2025-03-24 VITALS — SYSTOLIC BLOOD PRESSURE: 134 MMHG | DIASTOLIC BLOOD PRESSURE: 90 MMHG | BODY MASS INDEX: 25.76 KG/M2 | WEIGHT: 150.9 LBS

## 2025-03-24 DIAGNOSIS — R76.8 RED BLOOD CELL ANTIBODY POSITIVE: ICD-10-CM

## 2025-03-24 DIAGNOSIS — N93.9 ABNORMAL UTERINE BLEEDING (AUB): Primary | ICD-10-CM

## 2025-03-24 DIAGNOSIS — Z98.51 H/O TUBAL LIGATION: ICD-10-CM

## 2025-03-24 PROCEDURE — 99214 OFFICE O/P EST MOD 30 MIN: CPT | Mod: 25 | Performed by: OBSTETRICS & GYNECOLOGY

## 2025-03-24 PROCEDURE — 99459 PELVIC EXAMINATION: CPT | Performed by: OBSTETRICS & GYNECOLOGY

## 2025-03-24 PROCEDURE — 58100 BIOPSY OF UTERUS LINING: CPT | Performed by: OBSTETRICS & GYNECOLOGY

## 2025-03-24 NOTE — PATIENT INSTRUCTIONS
"HEAVY MENSTRUAL BLEEDING    In this condition (also called \"menorrhagia\"), the menstrual periods are heavier or longer than usual. You may pass large, dark clots. If you have frequent, heavy periods, you may become anemic (low blood count). Severe anemia may cause you to look pale and feel weak or fatigued. You might become short of breath with minimal exertion.  Heavy or prolonged bleeding may be due to female hormones being out of balance. Other causes include pelvic infection, benign fibroid tumors, use of an IUD or low thyroid function. It may occur in girls soon after they start to have their periods. Older women close to the age of menopause may also have this type of bleeding. If heavy bleeding does not stop, further evaluation will be needed to find out the exact cause.  HOME CARE:  If you tire easily, get plenty of rest. Avoid heavy exertion.  Do not take aspirin-containing products and anti-inflammatory medicines like ibuprofen (Advil, Motrin), which thin the blood and may cause more bleeding. You may take acetaminophen (Tylenol) for pain unless another pain medicine was prescribed.  If iron was prescribed for anemia, it will take about 4-6 weeks to rebuild your blood and correct the anemia. Take the iron as directed.  If hormones were prescribed to control your bleeding, take them just as instructed. If you stop too soon or miss doses, the bleeding may begin again. If you were prescribed a medicine called Provera (medroxyprogesterone), the bleeding should stop while you are taking it. Another period will start a few days after you finish the medicine.  FOLLOW UP: You should see your doctor within the next 1-2 days if your bleeding does not begin to improve. Otherwise, schedule an appointment within the next 1-2 weeks.  GET PROMPT MEDICAL ATTENTION if any of the following occur:  Heavier bleeding (soaking one pad an hour for three hours)  Heavy bleeding for more than one week  Fever of 100.4 F (38 C) or " higher, or as directed by your healthcare provider  Increase in abdominal pain  Feeling weak or dizzy, fainting

## 2025-03-24 NOTE — PROGRESS NOTES
Subjective  39 year old non-pregnant female presents today for an endometrial biopsy.  I saw patient in Dec of last year complaining of menorrhagia.  She use to bleed every 28 days and that doesn't happen anymore.  Patient states she is passing huge clots.  She will take a shower and actually go into her vagina and scoop out blood.  She will pass so much blood she is changing her clothes regularly.  When she leaves the house she has to bring new clothes.  She has had a D&C in the past-.  She was told her lining was 19mm then and she had just had a menses. Patient bleeds for 7 days.  Her last menstrual period 3/15/2025.  No dizziness, but shaky when it really heavy on 2 days.  Lightheaded as well.  3 c sections with a tubal ligation.  Patient had GHTN with her last.  She never followed up.  No tobacco abuse.  Family history of strokes in mother.  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  Patient was given Aygestin at the appointment in Dec however never took it because she doesn't want to be on any hormones.  She is wanting a hysterectomy.  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  Red blood cell antibody, but no history of blood transfusion.    I personally reviewed the ultrasound and the findings were benign.      ROS: 10 point ROS neg other than the symptoms noted above in the HPI.  Past Medical History:   Diagnosis Date    Female infertility     Menorrhagia with regular cycle      Past Surgical History:   Procedure Laterality Date     SECTION      x 2    DILATION AND CURETTAGE N/A 2019    ROTATOR CUFF REPAIR RT/LT      SEPTOPLASTY       Family History   Problem Relation Age of Onset    Alzheimer Disease Mother     Hypertension Father     Substance Abuse Maternal Grandmother     Anxiety Disorder Maternal Grandmother     Cerebrovascular Disease Maternal Grandmother     Seizure Disorder Maternal Grandfather      Social History     Tobacco Use    Smoking status: Former    Smokeless  tobacco: Never   Substance Use Topics    Alcohol use: No         Objective  Vitals: /90 (BP Location: Left arm, Cuff Size: Adult Regular)   Wt 68.4 kg (150 lb 14.4 oz)   LMP 03/15/2025 (Exact Date)   BMI 25.76 kg/m    BMI= Body mass index is 25.76 kg/m .    General appearance=well developed, well-nourished female  Gait=normal  Psych=mood is stable, alert and oriented x3  Abd=soft, Nontender/nondistended, no masses, no signs of hernias, no evidence of hepatosplenomegaly  PELVIC:    External genitalia: normal without lesions or masses  Urethral meatus: no lesions or prolapse noted, normal size  Urethra: no masses, non tender  Bladder: non tender, no fullness  Vagina: normal mucosa and rugae, no discharge.  Cervix: normal without lesion, no cervical motion tenderness, healthy, nulliparous  Uterus: small, mobile, nontender.  Adnexa: non tender, without masses  Rectal: deffered  Ext=no clubbing or cyanosis, no swelling      Pelvic ultrasound=1/13/2025:  FINDINGS:     UTERUS: Measures 9.3 x 5.4 x 4.7 cm. Normal in size and position with no masses.     ENDOMETRIUM: 7 mm. Normal smooth endometrium.     RIGHT OVARY: Measures 3.9 x 2.2 x 2.4 cm. It demonstrates normal follicular structure and color flow. 2 small cystic areas in the right ovary measuring 2.1 x 1.7 x 1.4 and 1.6 x 1.5 x 1.5, likely represent physiological follicles.     LEFT OVARY: Measures 4.0 x 1.8 x 1.9 cm. It demonstrates normal follicular structure and color flow.     No significant free fluid.                                                                      IMPRESSION:  1.  The endometrial stripe is not thickened measuring 7 mm in thickness.  2.  No uterine fibroids.  3.  Two adjacent small cystic areas in the right ovary measure up to 2.1 cm, likely represent physiological follicles.        Procedure:  Endometrial biopsy    Indication: Menometrorrhagia with age >35                     Discussed risk of bleeding, infection, uterine  perforation, cramping pain.  Pt agreed to proceed with procedure after all questions answered.    Speculum placed and cervix visualized.  Cervix cleansed with betadine x 3.  Allis clamp placed on anterior lip of the cervix.  Endometrial biopsy pipelle passed through cervix and uterus sounded to 8 cm.  Biopsy specimen collected with one pass with return of moderate amount of pink tissue.  Specimen placed in a labeled container and set aside to be sent to pathology.  Allis clamp removed from the cervix and sites hemostatic.  No bleeding noted from cervical os.     Patient tolerated the procedure well.  There were no apparent complications and bleeding was minimal.    She is instructed to use no tampons and have no intercourse for the next 5 days.        Assessment  1.)  Menometrorrhagia  2.)  History of 3 c sections  3.)  History of tubal ligation  4.)  History of GHTN  5.)  HTN  6.)  Red blood cell antibody        Plan  1.)  Endometrial biopsy today  2.)  Schedule TLH with partner to assist=consent signed today  3.)  Schedule pre-op with PCP  4.)  Schedule T&S for 2 days prior to surgery due to red blood cell antibody      Chronic, uncomplicated illness or injury, surgery scheduled, and procedure performed.   I discussed risks, benefits, and complications of surgery including but not limited to bleeding, infection, damage to nearby organs including but not limited to bladder, bowel, ureters, nerves, and blood vessels as well as anesthesia risks.  Injury may result at the time of surgery or in a separate procedure.  We also discussed the possibility of a reoperation if the pathology came back abnormal.  All questions answered, and accepting these risks, the patient elects to proceed with the procedure.        Nursing notes read and reviewed    Ying Carvajal DO

## 2025-03-25 ENCOUNTER — TELEPHONE (OUTPATIENT)
Dept: OBGYN | Facility: CLINIC | Age: 40
End: 2025-03-25
Payer: COMMERCIAL

## 2025-03-25 NOTE — TELEPHONE ENCOUNTER
[facility] SURGERY PLANNING/SCHEDULING WORKSHEET                                                     Jeannette Sykes                :  1985  MRN:  [id number]  Home Phone [phone]   Work Phone Not on file.   Mobile [phone]         Surgeon: Ying Carvajal DO     DIAGNOSIS:   Menometrorrhagia, history of 3 c sections     SURGICAL PROCEDURE:  Total laparoscopic hysterectomy      Surgery Location:  [facility]  Patient Surgery Class:  SDS  Length of Procedure:  120 minutes  Type of anesthesia:  General     Multi-surgeon case: Yes: Dr. Gilliland or Anival  Additional OR technician needed for assistance?:   No  Vendor needed: No  Positioning:  Lithotomy  Laterality:  NA  Date requested:   When able     Special Equipment: Ligassure  Special Instructions for patient:  Not needed  Precautions:  NONE  :  NOT NEEDED     Sterilization consent:  Yes and was signed on 3/24/2025.     Preop: Pre-op options: PCP  Pre-surgery consult needed:  Not applicable.  Postop evaluation needed:  2 weeks and 6 weeks     ALLERGIES:   Allergies         Allergies   Allergen Reactions    Blood Transfusion Related (Informational Only) Other (See Comments)       Patient has a history of a clinically significant antibody against RBC antigens.  A delay in compatible RBCs may occur.          BMI:There is no height or weight on file to calculate BMI.      The proposed surgical procedure is considered INTERMEDIATE risk.        Ying Carvajal DO    3/24/2025    SURGERY SCHEDULING AND PRECERTIFICATION    Medical Record Number: [id number]  Jeannette Sykes  YOB: 1985   Phone: [phone] (home)   Primary Provider: Lu Masterson    Reason for Admit:  ICD-10 CODE:  N92.1, Z98.891    Surgeon: Ying Carvajal DO  Surgical Procedure: Total laparoscopic hysterectomy, 45412    Date of Surgery 2025 Time of Surgery 3:00 PM  Surgery to be performed at:  [facility]  Status:  Outpatient  Type of Anesthesia Anticipated: General    Sterilization consent:  Yes and was signed on 03/24/2025.    Pre-Op: On 05/06/2025 with Lu Masterson PA   at [facility]  COVID testing:  Per Provider's discretion Covid testing is not indicated.     Post-Op:   2 weeks on 06/06/2025 with Dr. Carvajal at [facility] and 6 weeks on 06/27/2025 with Dr. Carvajal at [facility]  Pre-certification routed to [employer]:  Yes    Surgery packet: Sent via [facility]  Patient instructed NPO 12 hours prior to surgery, arrive 1.5 hours  prior to surgery, must have a .  Patient understood and agrees to the plan.      Requestor:  Sanam Wood     Location:  [facility]'s [phone]

## 2025-03-27 LAB
PATH REPORT.COMMENTS IMP SPEC: NORMAL
PATH REPORT.COMMENTS IMP SPEC: NORMAL
PATH REPORT.FINAL DX SPEC: NORMAL
PATH REPORT.GROSS SPEC: NORMAL
PATH REPORT.MICROSCOPIC SPEC OTHER STN: NORMAL
PATH REPORT.RELEVANT HX SPEC: NORMAL
PHOTO IMAGE: NORMAL

## 2025-03-30 ENCOUNTER — MYC MEDICAL ADVICE (OUTPATIENT)
Dept: OBGYN | Facility: OTHER | Age: 40
End: 2025-03-30
Payer: COMMERCIAL

## 2025-03-30 NOTE — LETTER
March 31, 2025      Jeannette Sykes  [address] [facility] [address]        To Whom It May Concern:    Jeannette Sykes was seen in our clinic.  She will be having surgery on May 22nd.  Please excuse her from work for 6 weeks postop (July 3rd).  Please let me know if you have any questions or concerns.        Sincerely,        Ying Carvajal, DO    Electronically signed

## 2025-05-06 ENCOUNTER — OFFICE VISIT (OUTPATIENT)
Dept: FAMILY MEDICINE | Facility: OTHER | Age: 40
End: 2025-05-06
Payer: COMMERCIAL

## 2025-05-06 VITALS
BODY MASS INDEX: 25.95 KG/M2 | TEMPERATURE: 99.4 F | HEIGHT: 64 IN | WEIGHT: 152 LBS | HEART RATE: 108 BPM | SYSTOLIC BLOOD PRESSURE: 134 MMHG | DIASTOLIC BLOOD PRESSURE: 76 MMHG | OXYGEN SATURATION: 98 % | RESPIRATION RATE: 20 BRPM

## 2025-05-06 DIAGNOSIS — N93.9 ABNORMAL UTERINE BLEEDING (AUB): ICD-10-CM

## 2025-05-06 DIAGNOSIS — Z01.818 PREOP GENERAL PHYSICAL EXAM: Primary | ICD-10-CM

## 2025-05-06 LAB
ERYTHROCYTE [DISTWIDTH] IN BLOOD BY AUTOMATED COUNT: 12.7 % (ref 10–15)
HCT VFR BLD AUTO: 39.5 % (ref 35–47)
HGB BLD-MCNC: 13.3 G/DL (ref 11.7–15.7)
MCH RBC QN AUTO: 31.3 PG (ref 26.5–33)
MCHC RBC AUTO-ENTMCNC: 33.7 G/DL (ref 31.5–36.5)
MCV RBC AUTO: 93 FL (ref 78–100)
PLATELET # BLD AUTO: 308 10E3/UL (ref 150–450)
RBC # BLD AUTO: 4.25 10E6/UL (ref 3.8–5.2)
WBC # BLD AUTO: 6.7 10E3/UL (ref 4–11)

## 2025-05-06 PROCEDURE — 85027 COMPLETE CBC AUTOMATED: CPT | Performed by: PHYSICIAN ASSISTANT

## 2025-05-06 PROCEDURE — 36415 COLL VENOUS BLD VENIPUNCTURE: CPT | Performed by: PHYSICIAN ASSISTANT

## 2025-05-06 PROCEDURE — 99214 OFFICE O/P EST MOD 30 MIN: CPT | Performed by: PHYSICIAN ASSISTANT

## 2025-05-06 ASSESSMENT — PAIN SCALES - GENERAL: PAINLEVEL_OUTOF10: NO PAIN (0)

## 2025-05-06 NOTE — PATIENT INSTRUCTIONS
[facility]    If you have any questions regarding to your visit please contact your care team:       Team Red:   Clinic Hours Telephone Number   Dr. Susanne Canales NP 7am-7pm  Monday - Thursday   7am-5pm  Fridays  [phone]  (Appointment scheduling available 24/7)   Urgent Care - [place] [facility] - 11am-9pm Monday-Friday Saturday-Sunday- 9am-5pm   [place] - 5pm-9pm Monday-Friday Saturday-Sunday- 9am-5pm  [phone] - [place]  [phone] - [place]       What options do I have for a visit other than an office visit? We offer electronic visits (e-visits) and telephone visits, when medically appropriate.  Please check with your medical insurance to see if these types of visits are covered, as you will be responsible for any charges that are not paid by your insurance.      You can use [employer] (secure electronic communication) to access to your chart, send your primary care provider a message, or make an appointment. Ask a team member how to get started.     For a price quote for your services, please call our Consumer Price Line at [phone] or our Imaging Cost estimation line at [phone] (for imaging tests).  Upper respiratory infections are usually caused by viruses and, sometimes certain bacteria.  Antibiotics don't help the vast majority of people recover any quicker even when caused by a bacteria.  The body will fight this infection but it needs to be treated well in order to help heal itself.  Rest as needed.  It is ok to reduce food intake if appetite is poor but it is quite important to maintain/increase fluid intake.    For cough, dextromethorphan/guaifenesin combinations help loosen secretions and suppress cough safely without significant risk of sedation. Often 2 puffs four times daily of an albuterol inhaler will help with bronchitis.  This is a prescription medicine.    For nasal congestion and sinus  How to Take Your Medication Before Surgery  Preoperative Medication Instructions   Antiplatelet or Anticoagulation Medication Instructions   - We reviewed the medication list and the patient is not on an antiplatelet or anticoagulation medications.    Additional Medication Instructions  We reviewed the medication list and there are no chronic medications that need to be adjusted for this procedure.       Patient Education   Preparing for Your Surgery  For Adults  Getting started  In most cases, a nurse will call to review your health history and instructions. They will give you an arrival time based on your scheduled surgery time. Please be ready to share:  Your doctor's clinic name and phone number  Your medical, surgical, and anesthesia history  A list of allergies and sensitivities  A list of medicines, including herbal treatments and over-the-counter drugs  Whether the patient has a legal guardian (ask how to send us the papers in advance)  Note: You may not receive a call if you were seen at our PAC (Preoperative Assessment Center).  Please tell us if you're pregnant--or if there's any chance you might be pregnant. Some surgeries may injure a fetus (unborn baby), so they require a pregnancy test. Surgeries that are safe for a fetus don't always need a test, and you can choose whether to have one.   Preparing for surgery  Within 10 to 30 days of surgery: Have a pre-op exam (sometimes called an H&P, or History and Physical). This can be done at a clinic or pre-operative center.  If you're having a , you may not need this exam. Talk to your care team.  At your pre-op exam, talk to your care team about all medicines you take. (This includes CBD oil and any drugs, such as THC, marijuana, and other forms of cannabis.) If you need to stop any medicine before surgery, ask when to start taking it again.  This is for your safety. Many medicines and drugs can make you bleed too much during surgery. Some change  how well surgery (anesthesia) drugs work.  Call your insurance company to let them know you're having surgery. (If you don't have insurance, call [phone].)  Call your clinic if there's any change in your health. This includes a scrape or scratch near the surgery site, or any signs of a cold (sore throat, runny nose, cough, rash, fever).  Eating and drinking guidelines  For your safety: Unless your surgeon tells you otherwise, follow the guidelines below.  Eat and drink as normal until 8 hours before you arrive for surgery. After that, no food or milk. You can spit out gum when you arrive.  Drink clear liquids until 2 hours before you arrive. These are liquids you can see through, like water, Gatorade, and Propel Water. They also include plain black coffee and tea (no cream or milk).  No alcohol for 24 hours before you arrive. The night before surgery, stop any drinks that contain THC.  If your care team tells you to take medicine on the morning of surgery, it's okay to take it with a sip of water. No other medicines or drugs are allowed (including CBD oil)--follow your care team's instructions.  If you have questions the day of surgery, call your hospital or surgery center.   Preventing infection  Shower or bathe the night before and the morning of surgery. Follow the instructions your clinic gave you. (If no instructions, use regular soap.)  Don't shave or clip hair near your surgery site. We'll remove the hair if needed.  Don't smoke or vape the morning of surgery. No chewing tobacco for 6 hours before you arrive. A nicotine patch is okay. You may spit out nicotine gum when you arrive.  For some surgeries, the surgeon will tell you to fully quit smoking and nicotine.  We will make every effort to keep you safe from infection. We will:  Clean our hands often with soap and water (or an alcohol-based hand rub).  Clean the skin at your surgery site with a special soap that kills germs.  Give you a special gown to  pressure, pseudoephedrine (Sudafed) or phenylephrine is often helpful but it can cause elevations in blood pressure and insomnia.  Short courses of a nasal decongestant spray (Afrin or Neosinephrine) can be appropriate but their use should be restricted to 3 days due to the high risk of nasal addiction.    For pain and fevers, acetaminophen (Tylenol) is most appropriate.  Ibuprofen (Advil) or naproxen (Aleve) are useful too and last longer but they can cause elevation of blood pressure or stomach problems.    Antihistamines (Benadryl, Dimetapp, etc.) cause sedation, confusion, bowel and urinary abnormalities and are of little use for infectious causes of cough and nasal congestion.  Their use should be reserved for allergic symptoms.    PAIGE Velez CMA ([facility])   keep you warm. (Cold raises the risk of infection.)  Wear hair covers, masks, gowns, and gloves during surgery.  Give antibiotic medicine, if prescribed. Not all surgeries need this medicine.  What to bring on the day of surgery  Photo ID and insurance card  Copy of your health care directive, if you have one  Glasses and hearing aids (bring cases)  You can't wear contacts during surgery  Inhaler and eye drops, if you use them (tell us about these when you arrive)  CPAP machine or breathing device, if you use them  A few personal items, if spending the night  If you have . . .  A pacemaker, ICD (cardiac defibrillator), or other implant: Bring the ID card.  An implanted stimulator: Bring the remote control.  A legal guardian: Bring a copy of the certified (court-stamped) guardianship papers.  Please remove any jewelry, including body piercings. Leave jewelry and other valuables at home.  If you're going home the day of surgery  You must have a responsible adult drive you home. They should stay with you overnight as well.  If you don't have someone to stay with you, and you aren't safe to go home alone, we may keep you overnight. Insurance often won't pay for this.  After surgery  If it's hard to control your pain or you need more pain medicine, please call your surgeon's office.  Questions?   If you have any questions for your care team, list them here:   ____________________________________________________________________________________________________________________________________________________________________________________________________________________________________________________________  For informational purposes only. Not to replace the advice of your health care provider. Copyright   2003, 2019 [facility]. All rights reserved. Clinically reviewed by Bruno Kolb MD. [employer] 349170 - REV 08/24.

## 2025-05-06 NOTE — PROGRESS NOTES
Preoperative Evaluation  [address]  [facility] [address]  Phone: [phone]  Fax: [phone]  Primary Provider: Lu Masterson PA-C  Pre-op Performing Provider: Lu Masterson PA-C  May 6, 2025           5/6/2025   Surgical Information   What procedure is being done? hysterectomy   Facility or Hospital where procedure/surgery will be performed: [facility]   Who is doing the procedure / surgery? Greil   Date of surgery / procedure: 5/22/2025   Time of surgery / procedure: 3pm'gualberto   Where do you plan to recover after surgery? at home with family     Fax number for surgical facility: Fax: [phone]     Assessment & Plan     The proposed surgical procedure is considered INTERMEDIATE risk.      ICD-10-CM    1. Preop general physical exam  Z01.818 CBC with platelets      2. Abnormal uterine bleeding (AUB)  N93.9           - No identified additional risk factors other than previously addressed    Preoperative Medication Instructions  Antiplatelet or Anticoagulation Medication Instructions   - We reviewed the medication list and the patient is not on an antiplatelet or anticoagulation medications.    Additional Medication Instructions  We reviewed the medication list and there are no chronic medications that need to be adjusted for this procedure.    Recommendation  Approval given to proceed with proposed procedure, without further diagnostic evaluation.    The patient indicates understanding of these issues and agrees with the plan.    Signed Electronically by: Lu Masterson PA-C  A copy of this evaluation report is provided to the requesting physician.      Gurpreet Machado is a 39 year old, presenting for the following:  Pre-Op Exam          5/6/2025     4:20 PM   Additional Questions   Roomed by Fang   Accompanied by Self         5/6/2025     4:20 PM   Patient Reported Additional Medications    Patient reports taking the following new medications NA     HPI: Heavy bleeding all her life, worse after children, getting large clots, had D&C but bleeding returned         5/6/2025   Pre-Op Questionnaire   Have you ever had a heart attack or stroke? No   Have you ever had surgery on your heart or blood vessels, such as a stent placement, a coronary artery bypass, or surgery on an artery in your head, neck, heart, or legs? No   Do you have chest pain with activity? No   Do you have a history of heart failure? No   Do you currently have a cold, bronchitis or symptoms of other infection? No   Do you have a cough, shortness of breath, or wheezing? No   Do you or anyone in your family have previous history of blood clots? No   Do you or does anyone in your family have a serious bleeding problem such as prolonged bleeding following surgeries or cuts? No   Have you ever had problems with anemia or been told to take iron pills? No   Have you had any abnormal blood loss such as black, tarry or bloody stools, or abnormal vaginal bleeding? No   Have you ever had a blood transfusion? No   Are you willing to have a blood transfusion if it is medically needed before, during, or after your surgery? Yes   Have you or any of your relatives ever had problems with anesthesia? No   Do you have sleep apnea, excessive snoring or daytime drowsiness? No   Do you have any artifical heart valves or other implanted medical devices like a pacemaker, defibrillator, or continuous glucose monitor? No   Do you have artificial joints? No   Are you allergic to latex? No     Advance Care Planning  Discussed advance care planning with patient; however, patient declined at this time.    Preoperative Review of    reviewed - no record of controlled substances prescribed.    Status of Chronic Conditions:  See problem list for active medical problems.  Problems all longstanding and stable, except as noted/documented.  See ROS for pertinent  symptoms related to these conditions.    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  2005      MRSA (methicillin resistant Staphylococcus aureus) infection 2018     Priority: Medium      Past Medical History:   Diagnosis Date    Female infertility     Menorrhagia with regular cycle      Past Surgical History:   Procedure Laterality Date     SECTION      x 2    DILATION AND CURETTAGE N/A 2019    ROTATOR CUFF REPAIR RT/LT      SEPTOPLASTY       Current Outpatient Medications   Medication Sig Dispense Refill    albuterol (PROAIR HFA/PROVENTIL HFA/VENTOLIN HFA) 108 (90 Base) MCG/ACT inhaler Inhale 1-2 puffs into the lungs as needed for shortness of breath or wheezing.         Allergies   Allergen Reactions    Blood Transfusion Related (Informational Only) Other (See Comments)     Patient has a history of a clinically significant antibody against RBC antigens.  A delay in compatible RBCs may occur.         Social History     Tobacco Use    Smoking status: Former    Smokeless tobacco: Never   Substance Use Topics    Alcohol use: No     Family History   Problem Relation Age of Onset    Alzheimer Disease Mother     Hypertension Father     Substance Abuse Maternal Grandmother     Anxiety Disorder Maternal Grandmother     Cerebrovascular  "Disease Maternal Grandmother     Seizure Disorder Maternal Grandfather      History   Drug Use No             Review of Systems  Constitutional, neuro, ENT, endocrine, pulmonary, cardiac, gastrointestinal, genitourinary, musculoskeletal, integument and psychiatric systems are negative, except as otherwise noted.    Objective    /76   Pulse 108   Temp 99.4  F (37.4  C) (Temporal)   Resp 20   Ht 1.63 m (5' 4.17\")   Wt 68.9 kg (152 lb)   LMP 05/06/2025 (Exact Date)   SpO2 98%   BMI 25.95 kg/m     Estimated body mass index is 25.95 kg/m  as calculated from the following:    Height as of this encounter: 1.63 m (5' 4.17\").    Weight as of this encounter: 68.9 kg (152 lb).  Physical Exam  GENERAL: alert and no distress  EYES: Eyes grossly normal to inspection, PERRL and conjunctivae and sclerae normal  HENT: ear canals and TM's normal, nose and mouth without ulcers or lesions  NECK: no adenopathy, no asymmetry, masses, or scars  RESP: lungs clear to auscultation - no rales, rhonchi or wheezes  CV: regular rate and rhythm, normal S1 S2, no S3 or S4, no murmur, click or rub, no peripheral edema  ABDOMEN: soft, nontender, no hepatosplenomegaly, no masses and bowel sounds normal  MS: no gross musculoskeletal defects noted, no edema  SKIN: no suspicious lesions or rashes  NEURO: Normal strength and tone, mentation intact and speech normal  PSYCH: mentation appears normal, affect normal/bright    Recent Labs   Lab Test 02/24/25  1639   HGB 12.6         POTASSIUM 4.1   CR 0.62        Diagnostics  Labs pending at this time.  Results will be reviewed when available.     No EKG required, no history of coronary heart disease, significant arrhythmia, peripheral arterial disease or other structural heart disease.    Revised Cardiac Risk Index (RCRI)  The patient has the following serious cardiovascular risks for perioperative complications:   - No serious cardiac risks = 0 points     RCRI Interpretation: 0 " points: Class I (very low risk - 0.4% complication rate)

## 2025-05-07 ENCOUNTER — RESULTS FOLLOW-UP (OUTPATIENT)
Dept: FAMILY MEDICINE | Facility: OTHER | Age: 40
End: 2025-05-07

## 2025-05-07 NOTE — RESULT ENCOUNTER NOTE
Team please fax pre-op    Pablo Jason-MATHEW Correia  [facility] [place]

## 2025-05-11 ENCOUNTER — DOCUMENTATION ONLY (OUTPATIENT)
Dept: OBGYN | Facility: CLINIC | Age: 40
End: 2025-05-11
Payer: COMMERCIAL

## 2025-05-11 DIAGNOSIS — Z01.818 PRE-OP EXAM: Primary | ICD-10-CM

## 2025-05-11 NOTE — PROGRESS NOTES
This patient has an appointment for lab work but does not have any orders. Please place some future orders as needed.    Thank You,  Kristin Traylor MLT (ASCP)

## 2025-05-15 ENCOUNTER — PATIENT OUTREACH (OUTPATIENT)
Dept: CARE COORDINATION | Facility: CLINIC | Age: 40
End: 2025-05-15
Payer: COMMERCIAL

## 2025-05-20 ENCOUNTER — LAB (OUTPATIENT)
Dept: LAB | Facility: OTHER | Age: 40
End: 2025-05-20
Payer: COMMERCIAL

## 2025-05-20 DIAGNOSIS — Z01.818 PRE-OP EXAM: ICD-10-CM

## 2025-05-20 LAB
ERYTHROCYTE [DISTWIDTH] IN BLOOD BY AUTOMATED COUNT: 12.3 % (ref 10–15)
HCT VFR BLD AUTO: 40.6 % (ref 35–47)
HGB BLD-MCNC: 13.5 G/DL (ref 11.7–15.7)
MCH RBC QN AUTO: 30.8 PG (ref 26.5–33)
MCHC RBC AUTO-ENTMCNC: 33.3 G/DL (ref 31.5–36.5)
MCV RBC AUTO: 93 FL (ref 78–100)
PLATELET # BLD AUTO: 310 10E3/UL (ref 150–450)
RBC # BLD AUTO: 4.38 10E6/UL (ref 3.8–5.2)
WBC # BLD AUTO: 8.3 10E3/UL (ref 4–11)

## 2025-05-20 PROCEDURE — 85027 COMPLETE CBC AUTOMATED: CPT

## 2025-05-20 PROCEDURE — 36415 COLL VENOUS BLD VENIPUNCTURE: CPT

## 2025-05-22 ENCOUNTER — RESULTS FOLLOW-UP (OUTPATIENT)
Dept: OBGYN | Facility: OTHER | Age: 40
End: 2025-05-22

## 2025-05-27 ENCOUNTER — MYC MEDICAL ADVICE (OUTPATIENT)
Dept: OBGYN | Facility: OTHER | Age: 40
End: 2025-05-27
Payer: COMMERCIAL

## 2025-05-28 NOTE — TELEPHONE ENCOUNTER
"Pt is concerned she may have a UTI.    S/p hysterectomy on 5/22/25 with Dr. Carvajal.  Pt currently has a villa catheter in due to bladder being nicked during surgery.    Pt has been noticing a \"very potent, strong smell\" to her urine.  She notices it when she empties her bag but also notices it when sitting or walking.  She states it is not \"the normal urine smell.  She also had a feeling she needed to pee and had a burning sensation along with it.    Routing to Dr. Carvajal to see if she wants to test pt for a UTI or advise further.    Julianna Langofrd RN-[facility]    "

## 2025-05-28 NOTE — TELEPHONE ENCOUNTER
Please have patient reach out to Urology regarding this.  Was she given their contact information at the time of discharge?    Ying Carvajal, DO

## 2025-05-29 NOTE — TELEPHONE ENCOUNTER
Spoke with Dr. Carvajal about reaching out to [facility] to help pt get scheduled with them as pt has been having a hard time getting a hold of them to schedule to have her villa catheter taken out.  While Dr. Carvajal was asking me to do this, she was in the doctor's lounge at the hospital and a PA from [facility] overheard her talking to me and stated he will reach out to [facility] and have someone reach out to pt to help her schedule.    Julianna Langford, RN- [facility]

## 2025-05-29 NOTE — TELEPHONE ENCOUNTER
Patient calling wanting to provide update to Dr. Carvajal about her symptoms. She did contact [facility] and they sent in an antibiotic for cephalexin. She took her second dose this morning.   She went to the bathroom this morning to pass a stool. When wiping she had bright red vaginal bleeding on the toilet paper.   This has only occurred the one time. Denies any symptoms other than mild post-op pain.   Patient reports she still is not yet scheduled for an appointment with urology. Yesterday when she spoke with them the  was trying to have an appointment to remove the villa and then schedule the CT at a later date, then said she had to speak with her manager to schedule the appointment. Patient is quite frustrated with [facility] at this point.   I told patient I will send a message to Dr. Carvajal and team, but she should also let urology know of this episode of vaginal bleeding.     Elizabeth JACOBN, RN

## 2025-06-02 ENCOUNTER — TRANSFERRED RECORDS (OUTPATIENT)
Dept: HEALTH INFORMATION MANAGEMENT | Facility: CLINIC | Age: 40
End: 2025-06-02
Payer: COMMERCIAL

## 2025-06-03 ENCOUNTER — TELEPHONE (OUTPATIENT)
Dept: OBGYN | Facility: CLINIC | Age: 40
End: 2025-06-03
Payer: COMMERCIAL

## 2025-06-03 NOTE — TELEPHONE ENCOUNTER
Patient calling re: Short Term Disability paperwork.      S/P 5/21/2025 hysterectomy with Dr. Carvajal and Dr. Florian        Patient would like to try to see Dr. Carvajal before scheduled appointment on Friday 6/06/2025 to get STD paperwork completed sooner.    is on call today and will be back in clinic on Friday, there are no sooner appointments available with Dr. Carvajal.     Adilia WELSH RN - [facility]

## 2025-06-06 PROBLEM — N93.9 ABNORMAL UTERINE BLEEDING (AUB): Status: RESOLVED | Noted: 2019-03-05 | Resolved: 2025-06-06

## 2025-06-06 PROBLEM — Z90.710 S/P LAPAROSCOPIC HYSTERECTOMY: Status: ACTIVE | Noted: 2025-06-06

## 2025-06-09 NOTE — PROGRESS NOTES
Jeannette Sykes is a 40 year old female who is being evaluated via a billable telephone visit.    Patient was offered to complete visit over video, patient declined    What phone number would you like to be contacted at? [phone]  How would you like to obtain your AVS? Damion  Distant Location (provider location):  On-site  Patient Location: Home  Start Time: 0847  End Time: 08      SUBJECTIVE:       HPI: Jeannette Sykes is a 40 year old  is s/p TLH/BS with cystoscopy and laparoscopic cystotomy repair in coordination with [facility] on . Since surgery, she has been doing ok. She is s/p CT urogram  with subsequent removal of villa. She has had some mild vaginal bleeding. She has not been sexually active.   She was seen by Dr. Carvajal for a visit last week for post op but has been having a hard time getting approval for [facility] for her recovery.   She reports concern with going back to work due to pain with prolonged sitting, recent spotting/bleeding episode and fatigue. She has noticed that while being home with her children, who are active, she has been more tired, uncomfortable and unable to perform daily tasks after periods of increased activity. She is concerned that going back to work at this time would be too challenging, uncomfortable and draining for her. Furthermore, she has had challenges with sitting in prolonged positions, including pain and discomfort.    Gyn Hx: Patient's last menstrual period was 2025 (exact date).     Last pap was 2022 nil neg hpv. Next pap due - NA         Today's PHQ-2 Score:       3/24/2025     3:31 PM   PHQ-2 (  [employer])   Q1: Little interest or pleasure in doing things 0   Q2: Feeling down, depressed or hopeless 0   PHQ-2 Score 0    Q1: Little interest or pleasure in doing things Not at all   Q2: Feeling down, depressed or hopeless Not at all   PHQ-2 Score 0       Patient-reported     Today's PHQ-9 Score:        No data to display              Today's SALOME-7  Score:        No data to display                Problem list and histories reviewed & adjusted, as indicated.  Additional history: as documented.    Patient Active Problem List   Diagnosis    History of abnormal cervical Pap smear    History of UTI    MRSA (methicillin resistant Staphylococcus aureus) infection    History of pre-eclampsia    Red blood cell antibody positive    Immunization due    Hypertension goal BP (blood pressure) < 130/80    Numerous moles    H/O tubal ligation    S/P laparoscopic hysterectomy     Past Surgical History:   Procedure Laterality Date     SECTION      x 2    DILATION AND CURETTAGE N/A 2019    ROTATOR CUFF REPAIR RT/LT      SEPTOPLASTY        Social History     Tobacco Use    Smoking status: Former    Smokeless tobacco: Never   Substance Use Topics    Alcohol use: No      Problem (# of Occurrences) Relation (Name,Age of Onset)    Anxiety Disorder (1) Maternal Grandmother    Substance Abuse (1) Maternal Grandmother    Alzheimer Disease (1) Mother    Hypertension (1) Father    Cerebrovascular Disease (1) Maternal Grandmother    Seizure Disorder (1) Maternal Grandfather              Current Outpatient Medications   Medication Sig Dispense Refill    albuterol (PROAIR HFA/PROVENTIL HFA/VENTOLIN HFA) 108 (90 Base) MCG/ACT inhaler Inhale 1-2 puffs into the lungs as needed for shortness of breath or wheezing.       No current facility-administered medications for this visit.     Allergies   Allergen Reactions    Blood Transfusion Related (Informational Only) Other (See Comments)     Patient has a history of a clinically significant antibody against RBC antigens.  A delay in compatible RBCs may occur.        ROS:  10 Point review of systems negative other noted above in HPI    OBJECTIVE:     LMP 2025 (Exact Date)   There is no height or weight on file to calculate BMI.      Gen: Alert, oriented, appropriately interactive, NAD        In-Clinic Test Results:  No results found for  this or any previous visit (from the past 24 hours).    Path  Final Diagnosis    Uterus and cervix, hysterectomy - Unremarkable cervix, secretory endometrium, and unremarkable myometrium. Negative for atypia or malignancy.   Electronically signed by Re Cox MD on 2025 at 1151 CDT       ASSESSMENT/PLAN:                                                      Jeannette Sykes is a 40 year old  s/p TLH/BS with cystoscopy and laparoscopic cystotomy repair in coordination with [facility] on       ICD-10-CM    1. Postoperative state  Z98.890       2. Postoperative pain  G89.18           Doing well postoperatively; however, experiencing expected challenges with recover, including bleeding after increased activity as well as fatigue and discomfort. Given these issues, agree with 6 weeks FMLA for appropriate recovery to avoid future complications and allow for appropriate healing from surgery. Indications for office visit or visit to ER reviewed in detail, including recurrent bleeding or severe pain.   Continue postop restrictions, including lifting and reduced activity (including prolonged sitting with short breaks) levels until cleared at 6 week visit.    All questions answered. Patient in agreement with plan.      Latoya Florian DO  [facility]

## 2025-06-09 NOTE — PATIENT INSTRUCTIONS
If you have any questions regarding your visit, Please contact your care team.    [employer] [facility]: [phone]      Women s Health CLINIC HOURS TELEPHONE NUMBER   Latoya Florian DO.    JOSE FRANCISCO Montano - Surgery Scheduler  Sanam - Surgery Scheduler    HERNÁN Levine RN Kylie, RN     Monday, Thursday  [place]  7am-3pm    Tuesday, Wednesday  [place]  7am-3pm    Friday  [place]  1pm-3:30pm    Typical Surgery Days: Thursday or Friday   [facility]  [address] [place] [address] [phone] Phone  [phone] Fax    [address] [address] [phone] Phone    Imaging [phone] Phone    [facility] Labor and Delivery:  [phone] Phone     **Surgeries** Our Surgery Schedulers will contact you to schedule. If you do not receive a call within 3 business days, please call [phone].    Urgent Care locations:  [facility] Saturday and    9 am - 5 pm    Monday-Friday   12 pm - 8 pm  Saturday and    9 am - 5 pm   [phone]       If you need a medication refill, please contact your pharmacy. Please allow 3 business days for your refill to be completed.  As always, Thank you for trusting us with your healthcare needs!

## 2025-06-10 ENCOUNTER — VIRTUAL VISIT (OUTPATIENT)
Dept: OBGYN | Facility: CLINIC | Age: 40
End: 2025-06-10
Payer: COMMERCIAL

## 2025-06-10 DIAGNOSIS — G89.18 POSTOPERATIVE PAIN: ICD-10-CM

## 2025-06-10 DIAGNOSIS — Z98.890 POSTOPERATIVE STATE: Primary | ICD-10-CM

## 2025-06-10 PROCEDURE — 98016 BRIEF COMUNICAJ TECH-BSD SVC: CPT | Performed by: OBSTETRICS & GYNECOLOGY

## 2025-06-16 ENCOUNTER — TELEPHONE (OUTPATIENT)
Dept: OBGYN | Facility: CLINIC | Age: 40
End: 2025-06-16
Payer: COMMERCIAL

## 2025-06-16 NOTE — TELEPHONE ENCOUNTER
Unable to reach patient via phone. Left message to return call to clinic.  Dr. Carvajal would like patient to see Dr. Florian for her 6 week post op visit.  Patient currently scheduled with Dr. Carvajal on 6/27 10:15, patient can see Dr. Florian on 6/24 at 10:45 (would be a work in) if patient is able.  If day/time doesn't work, will have to look at further days.    Alisson Avelar MA  6/16/2025 8:33 AM

## 2025-07-19 ENCOUNTER — HEALTH MAINTENANCE LETTER (OUTPATIENT)
Age: 40
End: 2025-07-19